# Patient Record
Sex: MALE | Race: WHITE | HISPANIC OR LATINO | Employment: FULL TIME | URBAN - METROPOLITAN AREA
[De-identification: names, ages, dates, MRNs, and addresses within clinical notes are randomized per-mention and may not be internally consistent; named-entity substitution may affect disease eponyms.]

---

## 2020-06-03 ENCOUNTER — HOSPITAL ENCOUNTER (EMERGENCY)
Facility: MEDICAL CENTER | Age: 20
End: 2020-06-03
Attending: EMERGENCY MEDICINE
Payer: MEDICAID

## 2020-06-03 VITALS
SYSTOLIC BLOOD PRESSURE: 113 MMHG | HEIGHT: 68 IN | RESPIRATION RATE: 16 BRPM | OXYGEN SATURATION: 98 % | WEIGHT: 195.11 LBS | TEMPERATURE: 96.8 F | BODY MASS INDEX: 29.57 KG/M2 | DIASTOLIC BLOOD PRESSURE: 69 MMHG | HEART RATE: 75 BPM

## 2020-06-03 DIAGNOSIS — S29.011A MUSCLE STRAIN OF CHEST WALL, INITIAL ENCOUNTER: ICD-10-CM

## 2020-06-03 PROCEDURE — 99283 EMERGENCY DEPT VISIT LOW MDM: CPT

## 2020-06-03 PROCEDURE — 700101 HCHG RX REV CODE 250: Performed by: EMERGENCY MEDICINE

## 2020-06-03 PROCEDURE — A9270 NON-COVERED ITEM OR SERVICE: HCPCS | Performed by: EMERGENCY MEDICINE

## 2020-06-03 PROCEDURE — 700102 HCHG RX REV CODE 250 W/ 637 OVERRIDE(OP): Performed by: EMERGENCY MEDICINE

## 2020-06-03 RX ORDER — IBUPROFEN 600 MG/1
600 TABLET ORAL EVERY 6 HOURS PRN
Qty: 30 TAB | Refills: 0 | Status: SHIPPED | OUTPATIENT
Start: 2020-06-03 | End: 2023-05-30

## 2020-06-03 RX ORDER — IBUPROFEN 600 MG/1
600 TABLET ORAL ONCE
Status: COMPLETED | OUTPATIENT
Start: 2020-06-03 | End: 2020-06-03

## 2020-06-03 RX ORDER — LIDOCAINE 50 MG/G
1 PATCH TOPICAL EVERY 24 HOURS
Status: DISCONTINUED | OUTPATIENT
Start: 2020-06-03 | End: 2020-06-03 | Stop reason: HOSPADM

## 2020-06-03 RX ORDER — LIDOCAINE 50 MG/G
1 PATCH TOPICAL EVERY 24 HOURS
Qty: 10 PATCH | Refills: 0 | Status: SHIPPED | OUTPATIENT
Start: 2020-06-03 | End: 2023-05-30

## 2020-06-03 RX ADMIN — IBUPROFEN 600 MG: 600 TABLET ORAL at 19:00

## 2020-06-03 RX ADMIN — LIDOCAINE 1 PATCH: 50 PATCH TOPICAL at 19:01

## 2020-06-03 ASSESSMENT — LIFESTYLE VARIABLES: DO YOU DRINK ALCOHOL: NO

## 2020-06-04 NOTE — ED TRIAGE NOTES
Chief Complaint   Patient presents with   • Side Pain     Pain in left side and under left arm.  Noted since sat.  Was unable to work today.  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  Supr aware he was coming to ER

## 2020-06-04 NOTE — ED NOTES
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  Discharge instructions given and reviewed, patient educated to follow up with PCP, verbalized understanding.  Prescriptions given x 2.  All personal belongings in possession.  No questions at this time.

## 2020-06-04 NOTE — ED NOTES
Patient medicated for pain, see MAR.  Patient educated on lidocaine patch placement and removal after 12 hours, verbalized understanding.

## 2020-06-04 NOTE — ED PROVIDER NOTES
"ED Provider Note    CHIEF COMPLAINT  Chief Complaint   Patient presents with   • Side Pain     Pain in left side and under left arm.  Noted since sat.  Was unable to work today.  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  He does that he was working quite diligently in the other day and since that time is had persistent pain as noted above.  Worse with movement.  No difficulty breathing.  No fever chills.  No cough or congestion.  No abdominal discomfort.  Denies any blunt trauma.    REVIEW OF SYSTEMS  See HPI for further details. All other systems are negative.     PAST MEDICAL HISTORY       SOCIAL HISTORY  Social History     Tobacco Use   • Smoking status: Never Smoker   • Smokeless tobacco: Never Used   Substance and Sexual Activity   • Alcohol use: Yes     Comment: rarely   • Drug use: Never   • Sexual activity: Not on file       SURGICAL HISTORY  patient denies any surgical history    CURRENT MEDICATIONS  Home Medications     Reviewed by Ashlee Duncan R.N. (Registered Nurse) on 06/03/20 at 1818  Med List Status: Complete   Medication Last Dose Status        Patient Dima Taking any Medications                       ALLERGIES  No Known Allergies    PHYSICAL EXAM  VITAL SIGNS: /69   Pulse 75   Temp 36 °C (96.8 °F) (Temporal)   Resp 16   Ht 1.727 m (5' 8\")   Wt 88.5 kg (195 lb 1.7 oz)   SpO2 98%   BMI 29.67 kg/m²  @KYLAH[[id number]::@  Pulse ox interpretation: I interpret this pulse ox as normal.  Constitutional: Alert in no apparent distress.  HENT: Normocephalic, Atraumatic, Bilateral external ears normal. Nose normal.   Eyes: Pupils are equal and reactive. Conjunctiva normal, non-icteric.   Heart: Regular rate and rythm, no " murmurs.    Lungs: Clear to auscultation bilaterally.  Chest wall: Tender superior lateral chest just inferior to the axilla.  No bony step-off deformity.  No ecchymosis.  No rash.  No crepitance.  Reproduces pain.  Skin: Warm, Dry, No erythema, No rash.   Neurologic: Alert, Grossly non-focal.   Psychiatric: Affect normal, Judgment normal, Mood normal, Appears appropriate and not intoxicated.           COURSE & MEDICAL DECISION MAKING  Pertinent Labs & Imaging studies reviewed. (See chart for details)  Patient presented to the emerge department with the above complaint.  Muscular strain likely given presentation.  I do not believe that imaging would be beneficial at this point.  Very low likelihood of any rib fracture or other underlying issue.  Patient will be started with Lidoderm patch and NSAID.  This was initiated here and will be continued at home.  He has been given outpatient follow-up as well.  He is any return precautions with any changes or worsening.       The patient will return for worsening symptoms and is stable at the time of discharge. The patient verbalizes understanding and will comply.    FINAL IMPRESSION  1. Muscle strain of chest wall, initial encounter               Electronically signed by: Sunil Wang M.D., 6/3/2020 6:44 PM

## 2020-06-23 ENCOUNTER — HOSPITAL ENCOUNTER (EMERGENCY)
Facility: MEDICAL CENTER | Age: 20
End: 2020-06-23
Attending: EMERGENCY MEDICINE
Payer: COMMERCIAL

## 2020-06-23 ENCOUNTER — NON-PROVIDER VISIT (OUTPATIENT)
Dept: OCCUPATIONAL MEDICINE | Facility: CLINIC | Age: 20
End: 2020-06-23
Payer: COMMERCIAL

## 2020-06-23 VITALS
TEMPERATURE: 98.1 F | DIASTOLIC BLOOD PRESSURE: 80 MMHG | BODY MASS INDEX: 29 KG/M2 | OXYGEN SATURATION: 98 % | HEART RATE: 98 BPM | HEIGHT: 69 IN | WEIGHT: 195.77 LBS | RESPIRATION RATE: 18 BRPM | SYSTOLIC BLOOD PRESSURE: 122 MMHG

## 2020-06-23 DIAGNOSIS — M54.9 ACUTE MID BACK PAIN: ICD-10-CM

## 2020-06-23 DIAGNOSIS — Z02.83 ENCOUNTER FOR DRUG SCREENING: ICD-10-CM

## 2020-06-23 LAB
AMP AMPHETAMINE: NORMAL
BAR BARBITURATES: NORMAL
BREATH ALCOHOL COMMENT: NORMAL
BZO BENZODIAZEPINES: NORMAL
COC COCAINE: NORMAL
INT CON NEG: NORMAL
INT CON POS: NORMAL
MDMA ECSTASY: NORMAL
MET METHAMPHETAMINES: NORMAL
MTD METHADONE: NORMAL
OPI OPIATES: NORMAL
OXY OXYCODONE: NORMAL
PCP PHENCYCLIDINE: NORMAL
POC BREATHALIZER: 0 PERCENT (ref 0–0.01)
POC URINE DRUG SCREEN OCDRS: NORMAL
THC: NORMAL

## 2020-06-23 PROCEDURE — 80305 DRUG TEST PRSMV DIR OPT OBS: CPT | Performed by: NURSE PRACTITIONER

## 2020-06-23 PROCEDURE — 99282 EMERGENCY DEPT VISIT SF MDM: CPT

## 2020-06-23 PROCEDURE — 8899 PR URINE 11 PANEL - AFTER HOURS: Performed by: NURSE PRACTITIONER

## 2020-06-23 PROCEDURE — 82075 ASSAY OF BREATH ETHANOL: CPT | Performed by: NURSE PRACTITIONER

## 2020-06-23 RX ORDER — IBUPROFEN 600 MG/1
600 TABLET ORAL EVERY 6 HOURS PRN
Qty: 30 TAB | Refills: 0 | Status: SHIPPED | OUTPATIENT
Start: 2020-06-23 | End: 2023-05-30

## 2020-06-23 RX ORDER — LIDOCAINE 50 MG/G
1 PATCH TOPICAL EVERY 24 HOURS
Qty: 10 PATCH | Refills: 0 | Status: SHIPPED | OUTPATIENT
Start: 2020-06-23 | End: 2023-05-30

## 2020-06-23 NOTE — LETTER
"  FORM C-4:  EMPLOYEE’S CLAIM FOR COMPENSATION/ REPORT OF INITIAL TREATMENT  EMPLOYEE’S CLAIM - PROVIDE ALL INFORMATION REQUESTED   First Name Fidel Last Name Jaziel Birthdate 2000  Sex male Claim Number   Home Employee Address [address] [address]  [facility] [place] [facility] [place] [facility] [place]                                     Zip  [address] Height  1.753 m (5' 9\") Weight  88.8 kg (195 lb 12.3 oz) N  [phone]   Mailing Employee Address [address] [address]   [place] [facility] [place]               Zip  [address] Telephone  [phone] (home)  Primary Language Spoken  English   Insurer   Third Party   [employer] Employee's Occupation (Job Title) When Injury or Occupational Disease Occurred  Unknown   Employer's Name [employer] Telephone [phone]    Employer Address [address] [facility] [address] Zip [address]   Date of Injury  5/30/2020       Hour of Injury  7:00 AM Date Employer Notified  6/3/2020 Last Day of Work after Injury or Occupational Disease  6/23/2020 Supervisor to Whom Injury Reported  Brandy GIBBS   Address or Location of Accident (if applicable) [facility]   What were you doing at the time of accident? (if applicable) N/A    How did this injury or occupational disease occur? Be specific and answer in detail. 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 N/A Witnesses to the Accident  N/A   Nature of Injury or Occupational Disease  Workers' Compensation Part(s) of Body Injured or Affected  Lower Back Area (Lumbar Area & Lumbo-Sacral), N/A, N/A    I CERTIFY THAT THE ABOVE IS TRUE AND CORRECT TO THE BEST OF MY KNOWLEDGE AND THAT I HAVE PROVIDED THIS INFORMATION IN ORDER TO OBTAIN THE BENEFITS OF [place] OR [place]).  I HEREBY AUTHORIZE ANY PHYSICIAN, CHIROPRACTOR, SURGEON, PRACTITIONER, OR OTHER PERSON, ANY HOSPITAL, INCLUDING [facility] OR [facility], ANY MEDICAL SERVICE ORGANIZATION, ANY INSURANCE COMPANY, OR OTHER INSTITUTION OR ORGANIZATION TO RELEASE TO EACH OTHER, ANY MEDICAL OR OTHER INFORMATION, INCLUDING BENEFITS PAID OR PAYABLE, PERTINENT TO THIS INJURY OR DISEASE, EXCEPT INFORMATION RELATIVE TO DIAGNOSIS, TREATMENT AND/OR COUNSELING FOR AIDS, PSYCHOLOGICAL CONDITIONS, ALCOHOL OR CONTROLLED SUBSTANCES, FOR WHICH I MUST GIVE SPECIFIC AUTHORIZATION.  A PHOTOSTAT OF THIS AUTHORIZATION SHALL BE AS VALID AS THE ORIGINAL.  Date  06/23/2020     Place  [facility] [place]   Employee’s Signature   THIS REPORT MUST BE COMPLETED AND MAILED WITHIN 3 WORKING DAYS OF TREATMENT   Place [facility], EMERGENCY DEPT                                                                             Name of Facility [facility]   Date  6/23/2020 Diagnosis  (M54.9) Acute mid back pain Is there evidence the injured employee was under the influence of alcohol and/or another controlled substance at the time of accident?   Hour  10:39 AM Description of Injury or Disease  Acute mid back pain     Treatment     Have you advised the patient to remain off work five days or more?             X-Ray Findings    If Yes   From Date    To Date      From information given by the employee, together with medical evidence, can you directly connect this injury or occupational disease as job incurred?   If No, is employee capable of: Full Duty    Modified Duty      Is additional medical care by a physician indicated?   If Modified Duty, Specify any Limitations / Restrictions       Do you know of any previous injury or disease contributing to this condition or occupational disease?      Date 6/23/2020 Print Doctor’s Name Ken Lara certify  "the employer’s copy of this form was mailed on:   Address [address]  [place] [id number] [phone] INSURER’S USE ONLY   Provider’s Tax ID Number [id number] Telephone Dept: [phone]    Doctor’s Signature ROMEL Merlos D.O. Degree  M.D.      Form C-4 (rev.10/07)                                                                         BRIEF DESCRIPTION OF RIGHTS AND BENEFITS  (Pursuant to [place] 616C.050)    Notice of Injury or Occupational Disease (Incident Report Form C-1): If an injury or occupational disease (OD) arises out of and in the course of employment, you must provide written notice to your employer as soon as practicable, but no later than 7 days after the accident or OD. Your employer shall maintain a sufficient supply of the required forms.    Claim for Compensation (Form C-4): If medical treatment is sought, the form C-4 is available at the place of initial treatment. A completed \"Claim for Compensation\" (Form C-4) must be filed within 90 days after an accident or OD. The treating physician or chiropractor must, within 3 working days after treatment, complete and mail to the employer, the employer's insurer and third-party , the Claim for Compensation.    Medical Treatment: If you require medical treatment for your on-the-job injury or OD, you may be required to select a physician or chiropractor from a list provided by your workers’ compensation insurer, if it has contracted with an Organization for Managed Care (MCO) or Preferred Provider Organization (PPO) or providers of health care. If your employer has not entered into a contract with an MCO or PPO, you may select a physician or chiropractor from the Panel of Physicians and Chiropractors. Any medical costs related to your industrial injury or OD will be paid by your insurer.    Temporary Total Disability (TTD): If your doctor has certified that you are unable to work for a period of at least 5 consecutive days, or " 5 cumulative days in a 20-day period, or places restrictions on you that your employer does not accommodate, you may be entitled to TTD compensation.    Temporary Partial Disability (TPD): If the wage you receive upon reemployment is less than the compensation for TTD to which you are entitled, the insurer may be required to pay you TPD compensation to make up the difference. TPD can only be paid for a maximum of 24 months.    Permanent Partial Disability (PPD): When your medical condition is stable and there is an indication of a PPD as a result of your injury or OD, within 30 days, your insurer must arrange for an evaluation by a rating physician or chiropractor to determine the degree of your PPD. The amount of your PPD award depends on the date of injury, the results of the PPD evaluation and your age and wage.    Permanent Total Disability (PTD): If you are medically certified by a treating physician or chiropractor as permanently and totally disabled and have been granted a PTD status by your insurer, you are entitled to receive monthly benefits not to exceed 66 2/3% of your average monthly wage. The amount of your PTD payments is subject to reduction if you previously received a PPD award.    Vocational Rehabilitation Services: You may be eligible for vocational rehabilitation services if you are unable to return to the job due to a permanent physical impairment or permanent restrictions as a result of your injury or occupational disease.    Transportation and Per Qiana Reimbursement: You may be eligible for travel expenses and per qiana associated with medical treatment.    Reopening: You may be able to reopen your claim if your condition worsens after claim closure.     Appeal Process: If you disagree with a written determination issued by the insurer or the insurer does not respond to your request, you may appeal to the Department of Administration, , by following the instructions contained in  your determination letter. You must appeal the determination within 70 days from the date of the determination letter at [address], [address], [place], [place] [address], or [address], [address], [place], [place] [address]. If you disagree with the  decision, you may appeal to the Department of Administration, . You must file your appeal within 30 days from the date of the  decision letter at [address], [address], [place], [place] [address], or [address], [address], [place], [place] [address]. If you disagree with a decision of an , you may file a petition for judicial review with the District Court. You must do so within 30 days of the Appeal Officer’s decision. You may be represented by an  at your own expense or you may contact the [facility] for possible representation.    [place]  for Injured Workers (NAIW): If you disagree with a  decision, you may request that NAIW represent you without charge at an  Hearing. For information regarding denial of benefits, you may contact the [facility] at: [address], [address], [place] [address], [phone], or [address], [place] [address], [phone]    To File a Complaint with the Division: If you wish to file a complaint with the  of the Division of Industrial Relations (DIR),  please contact the Workers’ Compensation Section, [address], [address], [place], [place] [address], telephone [phone]) [phone], or [address], [address], [place], [place] [address], telephone [phone]) [phone].    For assistance with Workers’ Compensation Issues: You may contact the Office of the Governor Consumer Health Assistance, [address], [place], [place] [address], Toll Free 1-136.786.7363, Web site: http://govVolvant.[facility].nv., E-mail jovanny@govcha.[facility].nv.  D-2  (rev. 06/18)              __________________________________________________________________                                   06/23/2020            Employee Name / Signature                                                                                                                            Date

## 2020-06-23 NOTE — DISCHARGE INSTRUCTIONS
This is a Workmen's Comp. injury, this requires further evaluation and treatment through the Workmen's Comp. clinic.  You are given the number for [facility].  They have all other locations call the number to find out which is most convenient for you.  Please see them either today or tomorrow for follow-up/you can be in the system to get continued care for this work-related injury.

## 2020-06-23 NOTE — ED NOTES
PT ambulated to [facility], pt stated that he was here for another lidocaine patch rx and a referral to chiropractor.  PT reports he lost his DC paperwork and did not follow up with clinic

## 2020-06-23 NOTE — ED TRIAGE NOTES
Pt ambulatory to triage c/o left side mid back pain seen here for same 2 weeks ago pt states not getting better. Pt did not take rx that was given to him last time he was here states he lost them. nad

## 2020-06-23 NOTE — LETTER
"  FORM C-4:  EMPLOYEE’S CLAIM FOR COMPENSATION/ REPORT OF INITIAL TREATMENT  EMPLOYEE’S CLAIM - PROVIDE ALL INFORMATION REQUESTED   First Name Fidel Last Name Jaziel Birthdate 2000  Sex male Claim Number   Home Employee Address [address] [address]  [facility]                                     Zip  [address] Height  1.753 m (5' 9\") Weight  88.8 kg (195 lb 12.3 oz) N  [id number]   Mailing Employee Address [address] [address]   [facility]               Zip  [address] Telephone  [phone] (home)  Primary Language Spoken  English   Insurer   Third Party   [employer] Employee's Occupation (Job Title) When Injury or Occupational Disease Occurred  Unknown   Employer's Name [employer] Telephone [phone]    Employer Address [address] [facility] [address]] Zip [address]   Date of Injury  6/6/2020       Hour of Injury  7:00 AM Date Employer Notified  6/8/2020 Last Day of Work after Injury or Occupational Disease  6/23/2020 Supervisor to Whom Injury Reported  N/A   Address or Location of Accident (if applicable) [facility]   What were you doing at the time of accident? (if applicable) N/A    How did this injury or occupational disease occur? Be specific and answer in detail. 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 N/A Witnesses to the Accident  N/A   Nature of Injury or Occupational Disease  Workers' Compensation Part(s) of Body Injured or Affected  Lower Back Area (Lumbar Area & Lumbo-Sacral), N/A, N/A    I CERTIFY THAT THE ABOVE IS TRUE AND CORRECT TO THE BEST OF MY KNOWLEDGE AND THAT I HAVE PROVIDED THIS INFORMATION IN ORDER TO OBTAIN THE BENEFITS OF [place]  I HEREBY AUTHORIZE ANY PHYSICIAN, CHIROPRACTOR, SURGEON, PRACTITIONER, OR OTHER PERSON, ANY HOSPITAL, INCLUDING [facility] OR [facility], ANY MEDICAL SERVICE ORGANIZATION, ANY INSURANCE COMPANY, OR OTHER INSTITUTION OR ORGANIZATION TO RELEASE TO EACH OTHER, ANY MEDICAL OR OTHER INFORMATION, INCLUDING BENEFITS PAID OR PAYABLE, PERTINENT TO THIS INJURY OR DISEASE, EXCEPT INFORMATION RELATIVE TO DIAGNOSIS, TREATMENT AND/OR COUNSELING FOR AIDS, PSYCHOLOGICAL CONDITIONS, ALCOHOL OR CONTROLLED SUBSTANCES, FOR WHICH I MUST GIVE SPECIFIC AUTHORIZATION.  A PHOTOSTAT OF THIS AUTHORIZATION SHALL BE AS VALID AS THE ORIGINAL.  Date 06/23/2020     [facility]    Employee’s Signature   THIS REPORT MUST BE COMPLETED AND MAILED WITHIN 3 WORKING DAYS OF TREATMENT   Place [facility], EMERGENCY DEPT                                                                             Name of Facility [facility]   Date  6/23/2020 Diagnosis  (M54.9) Acute mid back pain Is there evidence the injured employee was under the influence of alcohol and/or another controlled substance at the time of accident?   Hour  10:17 AM Description of Injury or Disease  Acute mid back pain     Treatment     Have you advised the patient to remain off work five days or more?             X-Ray Findings    If Yes   From Date    To Date      From information given by the employee, together with medical evidence, can you directly connect this injury or occupational disease as job incurred?   If No, is employee capable of: Full Duty    Modified Duty      Is additional medical care by a physician indicated?   If Modified Duty, Specify any Limitations / Restrictions       Do you know of any previous injury or disease contributing to this condition or occupational disease?      Date 6/23/2020 Print Doctor’s Name Ken Lara I certify the  "employer’s copy of this form was mailed on:   Address [address]  [place] [id number] [phone] INSURER’S USE ONLY   Provider’s Tax ID Number [id number] Telephone Dept: [phone]    Doctor’s Signature ROMEL Merlos D.O. Degree        Form C-4 (rev.10/07)                                                                         BRIEF DESCRIPTION OF RIGHTS AND BENEFITS  (Pursuant to [place] 616[place])    Notice of Injury or Occupational Disease (Incident Report Form C-1): If an injury or occupational disease (OD) arises out of and in the course of employment, you must provide written notice to your employer as soon as practicable, but no later than 7 days after the accident or OD. Your employer shall maintain a sufficient supply of the required forms.    Claim for Compensation (Form C-4): If medical treatment is sought, the form C-4 is available at the place of initial treatment. A completed \"Claim for Compensation\" (Form C-4) must be filed within 90 days after an accident or OD. The treating physician or chiropractor must, within 3 working days after treatment, complete and mail to the employer, the employer's insurer and third-party , the Claim for Compensation.    Medical Treatment: If you require medical treatment for your on-the-job injury or OD, you may be required to select a physician or chiropractor from a list provided by your workers’ compensation insurer, if it has contracted with an Organization for Managed Care (MCO) or Preferred Provider Organization (PPO) or providers of health care. If your employer has not entered into a contract with an MCO or PPO, you may select a physician or chiropractor from the Panel of Physicians and Chiropractors. Any medical costs related to your industrial injury or OD will be paid by your insurer.    Temporary Total Disability (TTD): If your doctor has certified that you are unable to work for a period of at least 5 consecutive days, or 5 " cumulative days in a 20-day period, or places restrictions on you that your employer does not accommodate, you may be entitled to TTD compensation.    Temporary Partial Disability (TPD): If the wage you receive upon reemployment is less than the compensation for TTD to which you are entitled, the insurer may be required to pay you TPD compensation to make up the difference. TPD can only be paid for a maximum of 24 months.    Permanent Partial Disability (PPD): When your medical condition is stable and there is an indication of a PPD as a result of your injury or OD, within 30 days, your insurer must arrange for an evaluation by a rating physician or chiropractor to determine the degree of your PPD. The amount of your PPD award depends on the date of injury, the results of the PPD evaluation and your age and wage.    Permanent Total Disability (PTD): If you are medically certified by a treating physician or chiropractor as permanently and totally disabled and have been granted a PTD status by your insurer, you are entitled to receive monthly benefits not to exceed 66 2/3% of your average monthly wage. The amount of your PTD payments is subject to reduction if you previously received a PPD award.    Vocational Rehabilitation Services: You may be eligible for vocational rehabilitation services if you are unable to return to the job due to a permanent physical impairment or permanent restrictions as a result of your injury or occupational disease.    Transportation and Per Qiana Reimbursement: You may be eligible for travel expenses and per qiana associated with medical treatment.    Reopening: You may be able to reopen your claim if your condition worsens after claim closure.     Appeal Process: If you disagree with a written determination issued by the insurer or the insurer does not respond to your request, you may appeal to the Department of Administration, , by following the instructions contained in  your determination letter. You must appeal the determination within 70 days from the date of the determination letter at [address], [address], [place], [place] [address], or [address], [address], [place], [place] [address]. If you disagree with the  decision, you may appeal to the Department of Administration, . You must file your appeal within 30 days from the date of the  decision letter at [address], [address], [place], [place] [address], or [address], [address], [place], [place] [address]. If you disagree with a decision of an , you may file a petition for judicial review with the District Court. You must do so within 30 days of the Appeal Officer’s decision. You may be represented by an  at your own expense or you may contact the [facility] for possible representation.    [place]  for Injured Workers (NAIW): If you disagree with a  decision, you may request that NAIW represent you without charge at an  Hearing. For information regarding denial of benefits, you may contact the [facility] at: [address], [address], [place] [address], [phone], or [address], [place] [address], [phone]    To File a Complaint with the Division: If you wish to file a complaint with the  of the Division of Industrial Relations (DIR),  please contact the Workers’ Compensation Section, [address], [address], [place], [place] [address], telephone [phone]) [phone], or [address], [address], [place], [place] [address], telephone [phone]) [phone].    For assistance with Workers’ Compensation Issues: You may contact the Office of the Governor Consumer Health Assistance, [address], [place], [place] [address], Toll Free 1-830.746.3778, Web site: http://govClutter.[facility].nv., E-mail jovanny@govcha.[facility].nv.  D-2  (rev. 06/18)              __________________________________________________________________                                    _06/23/2020__            Employee Name / Signature                                                                                                                            Date

## 2020-06-23 NOTE — ED PROVIDER NOTES
"ED Provider  Scribed for Ken Lara D.O. by Nata Vines. 6/23/2020  9:52 AM    Means of arrival:Walk in  History obtained from:patient  History limited by: none    CHIEF COMPLAINT  Chief Complaint   Patient presents with   • Back Pain       HPI  Fidel Sheriff is a 20 y.o. male who presents for back pain onset two weeks ago. He describes that he was pulling pallets at work when he strained his back. He was seen in the ED on 6/3/2020 he was given a lidocaine patch and NSAIDS which he says he did not take because he lost them. Patient states that he was placed on light duty at work however he is continuing to experience the same back pain. He denies any improvements in pain. Patient states he does not have pain when sitting but the pain returns when he starts to work. Denies symptoms of numbness or weakness in legs, radiation of pain to legs, or fevers. Patient states he has not yet followed up with the workmen's comp clinic.    REVIEW OF SYSTEMS  See HPI for further details.     PAST MEDICAL HISTORY   None pertinent    SOCIAL HISTORY  Social History     Tobacco Use   • Smoking status: Never Smoker   • Smokeless tobacco: Never Used   Substance and Sexual Activity   • Alcohol use: Yes     Comment: rarely   • Drug use: Never   • Sexual activity: Not on file       SURGICAL HISTORY  patient denies any surgical history    CURRENT MEDICATIONS  Home Medications     Reviewed by Regina Sharma R.N. (Registered Nurse) on 06/23/20 at 0900  Med List Status: Partial   Medication Last Dose Status   ibuprofen (MOTRIN) 600 MG Tab  Active   lidocaine (LIDODERM) 5 % Patch  Active                ALLERGIES  No Known Allergies    PHYSICAL EXAM  VITAL SIGNS: /84   Pulse (!) 113   Temp 36.7 °C (98 °F) (Temporal)   Resp 18   Ht 1.753 m (5' 9\")   Wt 88.8 kg (195 lb 12.3 oz)   SpO2 98%   BMI 28.91 kg/m²   Constitutional: Alert in no apparent distress.  HENT: Normocephalic, Atraumatic, mucous membranes are moist. "   Eyes: Conjunctiva normal, non-icteric.   Heart: No peripheral cyanosis   Lungs: Respirations are even and unlabored   Back: Left lumbo thoracic paraspinal muscle spasms with mild tenderness  Skin: Warm, Dry, normal color.   Neurologic: Alert, Grossly non-focal.   Psychiatric: Affect normal, Judgment normal, Mood normal, Appears appropriate and not intoxicated.       COURSE  Pertinent Labs & Imaging studies reviewed. (See chart for details)    9:52 AM - Patient seen and examined at bedside. Discussed plan of care, including providing him with ibuprofen and a lidocaine patch to last through the day. I informed the patient that he will need to follow up with the [facility] Comp clinic as soon as possible as for further evaluation and treatment. Provided the patient with contact information for [facility]. Recommended he rest as much as possible to help improve his pain. Patient will be discharged at this time. He verbalizes agreement with discharge and plan of care.    MEDICAL DECISION MAKING  This is a 20 y.o. male who presents with continued pain after a work-related lifting/pulling injury.  He has not gotten his prescriptions filled because he lost some, and his has not followed up with [facility].    He has no neurological deficits to suspect spinal cord injury.  He will be given new prescriptions for pain control, and he is instructed to follow-up for her endoscopic clinic today or tomorrow.  He is given 1 more day of duty so he has time to see [facility]. clinic    The patient will return for new or worsening symptoms and is stable at the time of discharge.    DISPOSITION:  Patient will be discharged home in stable condition.    FOLLOW UP:  [facility]  [address] [address]  [phone]  Today        OUTPATIENT MEDICATIONS:  New Prescriptions    IBUPROFEN (MOTRIN) 600 MG TAB    Take 1 Tab by mouth every 6 hours as needed.    LIDOCAINE (LIDODERM) 5 % PATCH    Apply 1  Patch to skin as directed every 24 hours.       FINAL IMPRESSION  1. Acute mid back pain         Nata AHN (Scribe), am scribing for, and in the presence of, Ken Lara D.O..    Electronically signed by: Nata Vines (Scribe), 6/23/2020    Ken AHN D.O. personally performed the services described in this documentation, as scribed by Nata Vines in my presence, and it is both accurate and complete. E    The note accurately reflects work and decisions made by me.  Ken Lara D.O.  6/23/2020  11:10 AM

## 2020-06-23 NOTE — LETTER
"  FORM C-4:  EMPLOYEE’S CLAIM FOR COMPENSATION/ REPORT OF INITIAL TREATMENT  EMPLOYEE’S CLAIM - PROVIDE ALL INFORMATION REQUESTED   First Name Fidel Last Name Jaziel Birthdate 2000  Sex male Claim Number   Home Employee Address [address] [address]  [facility]                                     Zip  [address] Height  1.753 m (5' 9\") Weight  88.8 kg (195 lb 12.3 oz) N  [id number]   Mailing Employee Address [address] [address]   [facility]               Zip  [address] Telephone  [phone] (home)  Primary Language Spoken  English   Insurer   Third Party   [employer] Employee's Occupation (Job Title) When Injury or Occupational Disease Occurred  Unknown   Employer's Name [employer] Telephone [phone]    Employer Address [address] [facility] [address]] Zip [address]   Date of Injury  4/30/2020       Hour of Injury  7:00 AM Date Employer Notified  5/2/2020 Last Day of Work after Injury or Occupational Disease  6/23/2020 Supervisor to Whom Injury Reported  N/A   Address or Location of Accident (if applicable) [facility]   What were you doing at the time of accident? (if applicable) N/A    How did this injury or occupational disease occur? Be specific and answer in detail. 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 N/A Witnesses to the Accident  N/A   Nature of Injury or Occupational Disease  Workers' Compensation Part(s) of Body Injured or Affected  Lower Back Area (Lumbar Area & Lumbo-Sacral), N/A, N/A    I CERTIFY THAT THE ABOVE IS TRUE AND CORRECT TO THE BEST OF MY KNOWLEDGE AND THAT I HAVE PROVIDED THIS INFORMATION IN ORDER TO OBTAIN THE BENEFITS OF [place]).  I HEREBY AUTHORIZE ANY PHYSICIAN, CHIROPRACTOR, SURGEON, PRACTITIONER, OR OTHER PERSON, ANY HOSPITAL, INCLUDING [facility] OR [facility], ANY MEDICAL SERVICE ORGANIZATION, ANY INSURANCE COMPANY, OR OTHER INSTITUTION OR ORGANIZATION TO RELEASE TO EACH OTHER, ANY MEDICAL OR OTHER INFORMATION, INCLUDING BENEFITS PAID OR PAYABLE, PERTINENT TO THIS INJURY OR DISEASE, EXCEPT INFORMATION RELATIVE TO DIAGNOSIS, TREATMENT AND/OR COUNSELING FOR AIDS, PSYCHOLOGICAL CONDITIONS, ALCOHOL OR CONTROLLED SUBSTANCES, FOR WHICH I MUST GIVE SPECIFIC AUTHORIZATION.  A PHOTOSTAT OF THIS AUTHORIZATION SHALL BE AS VALID AS THE ORIGINAL.  Date 06/23/2020   [facility]  Employee’s Signature   THIS REPORT MUST BE COMPLETED AND MAILED WITHIN 3 WORKING DAYS OF TREATMENT   [facility], EMERGENCY DEPT                                                                             Name of Facility [facility]   Date  6/23/2020 Diagnosis  (M54.9) Acute mid back pain Is there evidence the injured employee was under the influence of alcohol and/or another controlled substance at the time of accident?   Hour  10:30 AM Description of Injury or Disease  Acute mid back pain     Treatment     Have you advised the patient to remain off work five days or more?             X-Ray Findings    If Yes   From Date    To Date      From information given by the employee, together with medical evidence, can you directly connect this injury or occupational disease as job incurred?   If No, is employee capable of: Full Duty    Modified Duty      Is additional medical care by a physician indicated?   If Modified Duty, Specify any Limitations / Restrictions       Do you know of any previous injury or disease contributing to this condition or occupational disease?      Date 6/23/2020 Print Doctor’s Name Ken Lara I certify the  "employer’s copy of this form was mailed on:   Address [address] [place] [address]  [place] [id number] [phone] INSURER’S USE ONLY   Provider’s Tax ID Number [id number] Telephone Dept: [phone]    Doctor’s Signature ROMEL Merlos D.O. Degree  M.D.      Form C-4 (rev.10/07)                                                                         BRIEF DESCRIPTION OF RIGHTS AND BENEFITS  (Pursuant to [place] 616[place].050)    Notice of Injury or Occupational Disease (Incident Report Form C-1): If an injury or occupational disease (OD) arises out of and in the course of employment, you must provide written notice to your employer as soon as practicable, but no later than 7 days after the accident or OD. Your employer shall maintain a sufficient supply of the required forms.    Claim for Compensation (Form C-4): If medical treatment is sought, the form C-4 is available at the place of initial treatment. A completed \"Claim for Compensation\" (Form C-4) must be filed within 90 days after an accident or OD. The treating physician or chiropractor must, within 3 working days after treatment, complete and mail to the employer, the employer's insurer and third-party , the Claim for Compensation.    Medical Treatment: If you require medical treatment for your on-the-job injury or OD, you may be required to select a physician or chiropractor from a list provided by your workers’ compensation insurer, if it has contracted with an Organization for Managed Care (MCO) or Preferred Provider Organization (PPO) or providers of health care. If your employer has not entered into a contract with an MCO or PPO, you may select a physician or chiropractor from the Panel of Physicians and Chiropractors. Any medical costs related to your industrial injury or OD will be paid by your insurer.    Temporary Total Disability (TTD): If your doctor has certified that you are unable to work for a period of at least 5 consecutive days, or 5 " cumulative days in a 20-day period, or places restrictions on you that your employer does not accommodate, you may be entitled to TTD compensation.    Temporary Partial Disability (TPD): If the wage you receive upon reemployment is less than the compensation for TTD to which you are entitled, the insurer may be required to pay you TPD compensation to make up the difference. TPD can only be paid for a maximum of 24 months.    Permanent Partial Disability (PPD): When your medical condition is stable and there is an indication of a PPD as a result of your injury or OD, within 30 days, your insurer must arrange for an evaluation by a rating physician or chiropractor to determine the degree of your PPD. The amount of your PPD award depends on the date of injury, the results of the PPD evaluation and your age and wage.    Permanent Total Disability (PTD): If you are medically certified by a treating physician or chiropractor as permanently and totally disabled and have been granted a PTD status by your insurer, you are entitled to receive monthly benefits not to exceed 66 2/3% of your average monthly wage. The amount of your PTD payments is subject to reduction if you previously received a PPD award.    Vocational Rehabilitation Services: You may be eligible for vocational rehabilitation services if you are unable to return to the job due to a permanent physical impairment or permanent restrictions as a result of your injury or occupational disease.    Transportation and Per Qiana Reimbursement: You may be eligible for travel expenses and per qiana associated with medical treatment.    Reopening: You may be able to reopen your claim if your condition worsens after claim closure.     Appeal Process: If you disagree with a written determination issued by the insurer or the insurer does not respond to your request, you may appeal to the Department of Administration, , by following the instructions contained in  your determination letter. You must appeal the determination within 70 days from the date of the determination letter at [address], [address], [place], [place] [address], or [address], [address], [place], [place] [address]. If you disagree with the  decision, you may appeal to the Department of Administration, . You must file your appeal within 30 days from the date of the  decision letter at [address], [address], [place], [place] [address], or [address], [address], [place], [place] [address]. If you disagree with a decision of an , you may file a petition for judicial review with the District Court. You must do so within 30 days of the Appeal Officer’s decision. You may be represented by an  at your own expense or you may contact the [facility] for possible representation.    [place]  for Injured Workers (NAIW): If you disagree with a  decision, you may request that NAIW represent you without charge at an  Hearing. For information regarding denial of benefits, you may contact the [facility] at: [address], [address], [place] [address], [phone], or [address], [address], [place] [address], [phone]    To File a Complaint with the Division: If you wish to file a complaint with the  of the Division of Industrial Relations (DIR),  please contact the Workers’ Compensation Section, [address], [address], [place], [place] [address], telephone [phone]) [phone], or [address], [address], [place], [place] [address], telephone [phone].    For assistance with Workers’ Compensation Issues: You may contact the Office of the Governor Consumer Health Assistance, [address], [address], [place], [place] [address], Toll Free 1-819.116.5829, Web site: http://govSocial Tools.[facility].nv., E-mail jovanny@govcha.[facility].nv.  D-2  (rev. 06/18)              __________________________________________________________________                                    06/23/2020            Employee Name / Signature                                                                                                                            Date

## 2020-06-30 ENCOUNTER — NON-PROVIDER VISIT (OUTPATIENT)
Dept: OCCUPATIONAL MEDICINE | Facility: CLINIC | Age: 20
End: 2020-06-30
Payer: COMMERCIAL

## 2020-06-30 DIAGNOSIS — Z02.83 ENCOUNTER FOR DRUG SCREENING: ICD-10-CM

## 2020-06-30 PROCEDURE — 8911 PR MRO FEE: Performed by: PREVENTIVE MEDICINE

## 2021-02-14 ENCOUNTER — HOSPITAL ENCOUNTER (EMERGENCY)
Facility: MEDICAL CENTER | Age: 21
End: 2021-02-14
Attending: EMERGENCY MEDICINE
Payer: COMMERCIAL

## 2021-02-14 VITALS
RESPIRATION RATE: 16 BRPM | HEART RATE: 75 BPM | SYSTOLIC BLOOD PRESSURE: 125 MMHG | HEIGHT: 69 IN | OXYGEN SATURATION: 96 % | TEMPERATURE: 97.7 F | DIASTOLIC BLOOD PRESSURE: 74 MMHG | WEIGHT: 189.38 LBS | BODY MASS INDEX: 28.05 KG/M2

## 2021-02-14 DIAGNOSIS — B07.0 PLANTAR WART: ICD-10-CM

## 2021-02-14 PROCEDURE — 99282 EMERGENCY DEPT VISIT SF MDM: CPT

## 2021-02-14 NOTE — ED TRIAGE NOTES
"Chief Complaint   Patient presents with   • Foot Pain     Pt believes he has a wart on his left foot/heel area. Pt has had it for aprox 2.5 months. Pt has been treating with \"Compound W\" and seeing no improvement. Pt states it is getting worse and more painful now. Pt unable to walk on heel part of foot.      /85   Pulse 80   Temp 37 °C (98.6 °F) (Temporal)   Resp 14   Ht 1.753 m (5' 9\")   Wt 85.9 kg (189 lb 6 oz)   SpO2 97%   BMI 27.97 kg/m²     Patient arrived to ED for the above complaint. Triage process explained to patient. Patient placed in lobby and told to notify staff of any changes.   "

## 2021-02-15 NOTE — ED NOTES
Assist RN: Patient given discharge instructions and follow up information, verbalized understanding.

## 2021-02-15 NOTE — DISCHARGE INSTRUCTIONS
Follow-up with your primary doctor, podiatry and/or orthopedics.  Return for new or concerning symptoms to include redness swelling or discharge.

## 2021-02-15 NOTE — ED PROVIDER NOTES
"CHIEF COMPLAINT  Chief Complaint   Patient presents with   • Foot Pain     Pt believes he has a wart on his left foot/heel area. Pt has had it for aprox 2.5 months. Pt has been treating with \"Compound W\" and seeing no improvement. Pt states it is getting worse and more painful now. Pt unable to walk on heel part of foot.        HPI  Fidel Sheriff is a 21 y.o. male who presents with a plantar wart that he has had for about 2 and half months.  He has been using \"Compound W\" and not seeing any improvement.  It is painful when he walks.  He notes no fever or redness to the foot.    REVIEW OF SYSTEMS  No fever, no redness to the foot    PAST MEDICAL HISTORY  No past medical history on file.    FAMILY HISTORY  No family history on file.    SOCIAL HISTORY  Social History     Socioeconomic History   • Marital status:      Spouse name: Not on file   • Number of children: Not on file   • Years of education: Not on file   • Highest education level: Not on file   Occupational History   • Not on file   Tobacco Use   • Smoking status: Never Smoker   • Smokeless tobacco: Never Used   Substance and Sexual Activity   • Alcohol use: Yes     Comment: rarely   • Drug use: Never   • Sexual activity: Not on file   Other Topics Concern   • Not on file   Social History Narrative   • Not on file     Social Determinants of Health     Financial Resource Strain:    • Difficulty of Paying Living Expenses:    Food Insecurity:    • Worried About Running Out of Food in the Last Year:    • Ran Out of Food in the Last Year:    Transportation Needs:    • Lack of Transportation (Medical):    • Lack of Transportation (Non-Medical):    Physical Activity:    • Days of Exercise per Week:    • Minutes of Exercise per Session:    Stress:    • Feeling of Stress :    Social Connections:    • Frequency of Communication with Friends and Family:    • Frequency of Social Gatherings with Friends and Family:    • Attends Ca[employer] Services:    • Active " "Member of Clubs or Organizations:    • Attends Club or Organization Meetings:    • Marital Status:    Intimate Partner Violence:    • Fear of Current or Ex-Partner:    • Emotionally Abused:    • Physically Abused:    • Sexually Abused:        SURGICAL HISTORY  No past surgical history on file.    CURRENT MEDICATIONS  Home Medications    **Home medications have not yet been reviewed for this encounter**         ALLERGIES  No Known Allergies    PHYSICAL EXAM  VITAL SIGNS: /85   Pulse 80   Temp 37 °C (98.6 °F) (Temporal)   Resp 14   Ht 1.753 m (5' 9\")   Wt 85.9 kg (189 lb 6 oz)   SpO2 97%   BMI 27.97 kg/m²      Constitutional: Well developed, Well nourished, No acute distress, Non-toxic appearance.   HENT: Normocephalic, Atraumatic  Cardiovascular: Regular pulse  Lungs: No respiratory distress  Skin: Warm, Dry, no rash  Extremities: On the left foot there is excessive dried skin in a circular pattern and 2 separate but connected areas that is consistent with a plantar wart x2, no surrounding erythema, distal pulses motor and sensory function intact  Neurologic: Alert, appropriate, follows commands  Psychiatric: Affect normal    RADIOLOGY/PROCEDURES  The area was prepped with Betadine and some of the dead skin was removed from the bottom of the foot-no bleeding or other complications.    COURSE & MEDICAL DECISION MAKING  Pertinent Labs & Imaging studies reviewed. (See chart for details)  This is a 21-year-old with a plantar wart.  We do not have podiatry on call and the patient has already tried medication for a prolonged period of time.  He will be referred to orthopedics for further intervention.    FINAL IMPRESSION  1.  Plantar wart  2.   3.         Electronically signed by: Tristian Barron M.D., 2/14/2021 4:15 PM        "

## 2021-09-30 ENCOUNTER — APPOINTMENT (OUTPATIENT)
Dept: RADIOLOGY | Facility: MEDICAL CENTER | Age: 21
End: 2021-09-30
Attending: EMERGENCY MEDICINE
Payer: COMMERCIAL

## 2021-09-30 ENCOUNTER — HOSPITAL ENCOUNTER (EMERGENCY)
Facility: MEDICAL CENTER | Age: 21
End: 2021-09-30
Attending: EMERGENCY MEDICINE | Admitting: EMERGENCY MEDICINE
Payer: COMMERCIAL

## 2021-09-30 VITALS
DIASTOLIC BLOOD PRESSURE: 76 MMHG | SYSTOLIC BLOOD PRESSURE: 126 MMHG | OXYGEN SATURATION: 99 % | RESPIRATION RATE: 18 BRPM | HEIGHT: 69 IN | BODY MASS INDEX: 27.99 KG/M2 | TEMPERATURE: 98.2 F | WEIGHT: 189 LBS | HEART RATE: 72 BPM

## 2021-09-30 DIAGNOSIS — S90.111A CONTUSION OF RIGHT GREAT TOE WITHOUT DAMAGE TO NAIL, INITIAL ENCOUNTER: ICD-10-CM

## 2021-09-30 PROCEDURE — 99283 EMERGENCY DEPT VISIT LOW MDM: CPT

## 2021-09-30 PROCEDURE — 73660 X-RAY EXAM OF TOE(S): CPT | Mod: RT

## 2021-09-30 NOTE — ED NOTES
Patient has been updated of results and has been discharged home in stable condition by ERP.    Discharge paperwork has been provided to patient and gone over with patient by this nurse. Patient was given the opportunity to voice any questions or concerns and has verbalized understanding of discharge instructions with no questions or concerns.    Patient is A/Ox4 and vital signs are stable on discharge.    Discharge instructions/paperwork as well as all personal belongings are in possession of patient on discharge, no belongings were left behind in room.     Patient is ambulatory out to [facility] at this time where ride awaits.     Work note provided to patient.

## 2021-09-30 NOTE — DISCHARGE INSTRUCTIONS
Use over-the-counter Tylenol and ibuprofen to help with the pain.    Return to the ER for any worsening toe pain, worsening bleeding, redness or swelling to the toe, drainage of pus from the toe, numbness or tingling to the toe, or for any concerns.    Use ice to help with the pain.    Follow-up with your primary care physician within the next 1 to 2 days.  If you do not have a primary care physician, follow-up at the [facility] clinic in the next 1 to 2 days.  Please call for appointment.

## 2021-09-30 NOTE — ED PROVIDER NOTES
"ED Provider Note    Scribed for Latisha Frazier M.D. by Medina Courtney. 9/30/2021  9:09 AM    Primary care provider: Pcp Pt States None  Means of arrival: Walk in   History obtained from: Patient   History limited by: None    CHIEF COMPLAINT  Chief Complaint   Patient presents with   • Toe Pain       HPI  Fidel Sheriff is a 21 y.o. male who presents to the Emergency Department for right first toe pain onset 5 hours ago. The patient states that he stubbed his toe on the door jam this morning around 4 AM and reports that it was \"gushing blood\".  No bleeding at this time.  He says that he is concerned that he lifted his toe nail. Patient denies numbness or current bleeding. He is not diabetic and does not believe his tetanus is up to date.     REVIEW OF SYSTEMS  Positives include right first toe pain. Negatives include no numbness or current bleeding.      PAST MEDICAL HISTORY   none    SURGICAL HISTORY  patient denies any surgical history    SOCIAL HISTORY  Social History     Tobacco Use   • Smoking status: Never Smoker   • Smokeless tobacco: Never Used   Substance Use Topics   • Alcohol use: Yes     Comment: rarely   • Drug use: Never      Social History     Substance and Sexual Activity   Drug Use Never       FAMILY HISTORY  History reviewed. No pertinent family history.    CURRENT MEDICATIONS  Home Medications     Reviewed by Regina Sharma R.N. (Registered Nurse) on 09/30/21 at 0832  Med List Status: Partial   Medication Last Dose Status   ibuprofen (MOTRIN) 600 MG Tab  Active   ibuprofen (MOTRIN) 600 MG Tab  Active   lidocaine (LIDODERM) 5 % Patch  Active   lidocaine (LIDODERM) 5 % Patch  Active                ALLERGIES  No Known Allergies    PHYSICAL EXAM  VITAL SIGNS: /73   Pulse 76   Temp 36.8 °C (98.2 °F) (Tympanic)   Resp 16   Ht 1.753 m (5' 9\")   Wt 85.7 kg (189 lb)   SpO2 100%   BMI 27.91 kg/m²   Constitutional:  Alert in no apparent distress.  HENT: Normocephalic, Bilateral external " ears normal. Nose normal.   Eyes: Conjunctiva normal, non-icteric.   Thorax & Lungs: Easy unlabored respirations  Skin: Visualized skin warm and dry, No erythema, No rash.   Extremities:   Right foot: Right great toe there is a small superficial abrasion to the distal tip of the toe, just under the overgrown toenail. The nail itself is well adhered to nail bed, no subungual hematoma, small corn to medial side of toe, mild tenderness to the distal phalanx.  Neurologic: Alert, clear speech  Psychiatric: Affect and Mood normal    RADIOLOGY  DX-TOE(S) 2+ RIGHT   Final Result      No acute fracture or dislocation is seen.        The radiologist's interpretation of all radiological studies have been reviewed by me.    COURSE & MEDICAL DECISION MAKING  Nursing notes and vital signs were reviewed. (See chart for details)    9:09 AM - Patient seen and examined at bedside. Patient will be treated with Adacel 0.5 ml. Ordered DX-Toes Right to evaluate his symptoms.     Decision Making:  The patient presents to the Emergency Department with complaint of right great toe pain after he stubbed his toe on the door jam today.  He was concerned because he had some bleeding after he jammed his toe/toenail.  He was concerned that the bleeding came from underneath the toenail.  The toenail is well adhered to the nailbed.  No subungual hematoma.  He has a very small abrasion/partial-thickness laceration to the distal tip of the great toe.  He is tender at the distal phalanx.  X-ray is unremarkable.  No evidence of fracture.  This time he is to take over-the-counter Tylenol and ibuprofen for discomfort.  He can use ice to help with the pain.  His tetanus was updated here in the ER.  He is safe and stable for outpatient management discharge home.  Is been given strict return precautions and discharge instructions and he understands treatment plan and follow-up.      DISPOSITION:  Patient will be discharged home in stable  condition.    FOLLOW UP:  [address] [id number] [phone]  Schedule an appointment as soon as possible for a visit in 2 days  If symptoms worsen return to ER      OUTPATIENT MEDICATIONS:  New Prescriptions    No medications on file          FINAL IMPRESSION  1. Contusion of right great toe without damage to nail, initial encounter Acute          This dictation has been created using voice recognition software. The accuracy of the dictation is limited by the abilities of the software. I expect there may be some errors of grammar and possibly content. I made every attempt to manually correct the errors within my dictation. However, errors related to voice recognition software may still exist and should be interpreted within the appropriate context.     I, Medina Courtney (Keren), am scribing for, and in the presence of, Latisha Frazier M.D..    Electronically signed by: Medina Courtney (Keren), 9/30/2021    I, Latisha Frazier M.D. personally performed the services described in this documentation, as scribed by Medina Courtney in my presence, and it is both accurate and complete. E    The note accurately reflects work and decisions made by me.  Latisha Frazier M.D.  9/30/2021  10:09 AM

## 2021-09-30 NOTE — ED TRIAGE NOTES
Pt ambulatory to triage c/o right 1st toe pain after he stubbed it this am. Bruising noted but no obvious deformity. nad

## 2022-02-25 ENCOUNTER — APPOINTMENT (OUTPATIENT)
Dept: RADIOLOGY | Facility: MEDICAL CENTER | Age: 22
End: 2022-02-25
Attending: EMERGENCY MEDICINE
Payer: COMMERCIAL

## 2022-02-25 ENCOUNTER — HOSPITAL ENCOUNTER (EMERGENCY)
Facility: MEDICAL CENTER | Age: 22
End: 2022-02-25
Attending: EMERGENCY MEDICINE
Payer: COMMERCIAL

## 2022-02-25 VITALS
BODY MASS INDEX: 26.26 KG/M2 | TEMPERATURE: 97.5 F | SYSTOLIC BLOOD PRESSURE: 129 MMHG | HEART RATE: 83 BPM | RESPIRATION RATE: 18 BRPM | OXYGEN SATURATION: 97 % | HEIGHT: 70 IN | DIASTOLIC BLOOD PRESSURE: 83 MMHG | WEIGHT: 183.42 LBS

## 2022-02-25 DIAGNOSIS — S60.511A ABRASION OF RIGHT HAND, INITIAL ENCOUNTER: ICD-10-CM

## 2022-02-25 DIAGNOSIS — S60.221A CONTUSION OF RIGHT HAND, INITIAL ENCOUNTER: ICD-10-CM

## 2022-02-25 DIAGNOSIS — F43.21 GRIEF REACTION: ICD-10-CM

## 2022-02-25 PROCEDURE — 304217 HCHG IRRIGATION SYSTEM

## 2022-02-25 PROCEDURE — 90715 TDAP VACCINE 7 YRS/> IM: CPT | Performed by: EMERGENCY MEDICINE

## 2022-02-25 PROCEDURE — 99283 EMERGENCY DEPT VISIT LOW MDM: CPT

## 2022-02-25 PROCEDURE — 700111 HCHG RX REV CODE 636 W/ 250 OVERRIDE (IP): Performed by: EMERGENCY MEDICINE

## 2022-02-25 PROCEDURE — 90471 IMMUNIZATION ADMIN: CPT

## 2022-02-25 PROCEDURE — 73130 X-RAY EXAM OF HAND: CPT | Mod: RT

## 2022-02-25 RX ADMIN — CLOSTRIDIUM TETANI TOXOID ANTIGEN (FORMALDEHYDE INACTIVATED), CORYNEBACTERIUM DIPHTHERIAE TOXOID ANTIGEN (FORMALDEHYDE INACTIVATED), BORDETELLA PERTUSSIS TOXOID ANTIGEN (GLUTARALDEHYDE INACTIVATED), BORDETELLA PERTUSSIS FILAMENTOUS HEMAGGLUTININ ANTIGEN (FORMALDEHYDE INACTIVATED), BORDETELLA PERTUSSIS PERTACTIN ANTIGEN, AND BORDETELLA PERTUSSIS FIMBRIAE 2/3 ANTIGEN 0.5 ML: 5; 2; 2.5; 5; 3; 5 INJECTION, SUSPENSION INTRAMUSCULAR at 02:15

## 2022-02-25 NOTE — DISCHARGE PLANNING
Alert Team:    Contacted by RN to provide resources for pt for psychiatry/therapy. Discussed outpatient psychiatric services resource list with pt, along with contacting human resources at his job to assist with access to counseling services through his work. Pt verbalized understanding of resources and all questions answered. Updated RN.

## 2022-02-25 NOTE — ED PROVIDER NOTES
ED Provider Note    CHIEF COMPLAINT  Chief Complaint   Patient presents with   • Hand Pain     X 30 min       HPI  Fidel Sheriff is a 22 y.o. male who presents after punching a wall earlier this evening. He reports being stressed out recently and was angry so he hit a wall multiple times with his R hand.  He denies any SI or HI. He denies any AH or VH. He reports hand pain since the event but denies any other injuries sustained or any weakness/numbness. Pt reports that his mother passed away from cicayda in August.  He and his family have had a difficult time processing this and his family has been going through a considerable amount of turmoil.  He was perseverating over this this evening, became increasingly more sad and punched the wall out of frustration.  Patient reports he feels he would be safe if discharged home, he does not feel that he is a danger to himself or others.    REVIEW OF SYSTEMS  ROS  See HPI for further details. All other systems are negative.     PAST MEDICAL HISTORY       SOCIAL HISTORY  Social History     Tobacco Use   • Smoking status: Never Smoker   • Smokeless tobacco: Never Used   Vaping Use   • Vaping Use: Never used   Substance and Sexual Activity   • Alcohol use: Not Currently   • Drug use: Never   • Sexual activity: Not on file       SURGICAL HISTORY  patient denies any surgical history    CURRENT MEDICATIONS  Home Medications     Reviewed by Hanh Oconnell R.N. (Registered Nurse) on 02/25/22 at 0055  Med List Status: <None>   Medication Last Dose Status   ibuprofen (MOTRIN) 600 MG Tab  Active   ibuprofen (MOTRIN) 600 MG Tab  Active   lidocaine (LIDODERM) 5 % Patch  Active   lidocaine (LIDODERM) 5 % Patch  Active                ALLERGIES  No Known Allergies    PHYSICAL EXAM  Vitals:    02/25/22 0050   BP: 135/81   Pulse: 68   Resp: 18   Temp: 36.2 °C (97.2 °F)   SpO2: 100%       Physical Exam  Constitutional:       Appearance: He is well-developed.   Eyes:       Conjunctiva/sclera: Conjunctivae normal.   Pulmonary:      Effort: Pulmonary effort is normal.   Musculoskeletal:      Cervical back: Normal range of motion and neck supple.      Comments: Scattered abrasions on patient's fingers and hand.  Mild tenderness at the distal head of the third MCP.  Strength with sensation is intact in ulnar radial and median distribution.   Skin:     General: Skin is warm.   Neurological:      Mental Status: He is alert and oriented to person, place, and time.   Psychiatric:         Behavior: Behavior normal.           COURSE & MEDICAL DECISION MAKING  Pertinent Labs & Imaging studies reviewed. (See chart for details)    Patient here after punching a wall, his exam is consistent with likely bony contusion versus possible fracture of his third metacarpal.  Will check x-ray.  Patient's abrasions do not require repair.  These have been irrigated by nursing.  Patient's tetanus has been updated.  Patient denies any SI or HI, I do not believe that he is a danger to himself or others.  I have discussed his case with our alert team who is given patient resources for counseling.      The patient will return for worsening symptoms and is stable at the time of discharge. The patient verbalizes understanding and will comply.    FINAL IMPRESSION    1. Abrasion of right hand, initial encounter    2. Contusion of right hand, initial encounter    3. Grief reaction               Electronically signed by: Renny Sierra M.D., 2/25/2022 1:44 AM

## 2022-02-25 NOTE — ED TRIAGE NOTES
"  Chief Complaint   Patient presents with   • Hand Pain     X 30 min     Pt to triage for above complaint. Pt states about 30 mins ago he became very upset \"with everything going on in his life\" and punched a wall \"once or twice\" with his right hand. Multiple small lacerations noted to hand, no obvious deformity or active bleeding, +CMS. Pt is calm and cooperative in triage, denies SI/HI but states \"I just feel like I need to talk to somebody who will listen.\"    Pt is alert/oriented, following commands, and answering questions appropriately. No acute distress noted in triage and respirations are even and unlabored.   Pt placed in lobby and educated on triage process. Pt encouraged to alert staff for any changes in condition.    ./81   Pulse 68   Temp 36.2 °C (97.2 °F) (Temporal)   Resp 18   Ht 1.778 m (5' 10\")   Wt 83.2 kg (183 lb 6.8 oz)   SpO2 100% Comment: Room air  BMI 26.32 kg/m²       "

## 2022-02-25 NOTE — ED NOTES
Bandages placed to right hand after soaking hand in warm water. Pt given resources by alert team paulo.

## 2022-06-10 ENCOUNTER — HOSPITAL ENCOUNTER (EMERGENCY)
Facility: MEDICAL CENTER | Age: 22
End: 2022-06-10
Attending: EMERGENCY MEDICINE
Payer: COMMERCIAL

## 2022-06-10 ENCOUNTER — APPOINTMENT (OUTPATIENT)
Dept: RADIOLOGY | Facility: MEDICAL CENTER | Age: 22
End: 2022-06-10
Attending: EMERGENCY MEDICINE
Payer: COMMERCIAL

## 2022-06-10 VITALS
OXYGEN SATURATION: 98 % | WEIGHT: 181 LBS | TEMPERATURE: 97.4 F | BODY MASS INDEX: 25.91 KG/M2 | HEART RATE: 68 BPM | HEIGHT: 70 IN | RESPIRATION RATE: 18 BRPM | SYSTOLIC BLOOD PRESSURE: 133 MMHG | DIASTOLIC BLOOD PRESSURE: 73 MMHG

## 2022-06-10 DIAGNOSIS — S46.912A STRAIN OF LEFT SHOULDER, INITIAL ENCOUNTER: ICD-10-CM

## 2022-06-10 PROCEDURE — A9270 NON-COVERED ITEM OR SERVICE: HCPCS | Performed by: EMERGENCY MEDICINE

## 2022-06-10 PROCEDURE — 73030 X-RAY EXAM OF SHOULDER: CPT | Mod: LT

## 2022-06-10 PROCEDURE — 99284 EMERGENCY DEPT VISIT MOD MDM: CPT

## 2022-06-10 PROCEDURE — 700102 HCHG RX REV CODE 250 W/ 637 OVERRIDE(OP): Performed by: EMERGENCY MEDICINE

## 2022-06-10 RX ORDER — NAPROXEN 500 MG/1
500 TABLET ORAL 2 TIMES DAILY
Status: DISCONTINUED | OUTPATIENT
Start: 2022-06-10 | End: 2022-06-10 | Stop reason: HOSPADM

## 2022-06-10 RX ADMIN — NAPROXEN 500 MG: 500 TABLET ORAL at 09:10

## 2022-06-10 NOTE — ED PROVIDER NOTES
"ED Provider Note    CHIEF COMPLAINT  Chief Complaint   Patient presents with   • Low Back Pain     On the left side. Pt reports heavy lifting.        HPI    Primary care provider: Pcp Pt States None  Means of arrival: POV/Walk-in  History obtained from: Patient  History limited by: Nothing    Fidel Sheriff is a 22 y.o. male who presents with left side pain.  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  No specific falls or injuries or trauma.  Been sore for the last 3 days, fairly constant and worsening.  Not tried anything to feel better.  Wanted to \"get checked out\" before going back to work.  Does not wish to report this is a work-related injury.  No prior episodes.  Right-hand-dominant.  Pain is achy and sore, mostly to the left posterior shoulder and sometimes going down to the left lower back.  No numbness weakness or tingling, no bowel or bladder incontinence or inability to void.  No other recent illness or medical complaints.  No swelling, no skin changes, takes no daily meds.    REVIEW OF SYSTEMS  Constitutional: Negative for fever or chills.   HENT: Negative for headache or rhinorrhea.  Respiratory: Negative for cough or shortness of breath.    Cardiovascular: Negative for chest pain or loss of consciousness.   Gastrointestinal: Negative for nausea, vomiting, or abdominal pain.   Genitourinary: Negative for incontinence or inability to void.  Musculoskeletal: Positive for left shoulder and low back pain.  Skin: Negative for bruising or rash.   Neurological: Negative for sensory or motor changes.       PAST MEDICAL HISTORY  No chronic past medical history.    PAST FAMILY HISTORY  Reviewed with patient, no pertinent past family history.    SOCIAL HISTORY  Social History     Tobacco Use   • Smoking status: Never Smoker   • Smokeless tobacco: Never Used   Vaping Use   • Vaping Use: Never used   Substance and Sexual Activity   • Alcohol use: Not Currently   • Drug use: Never   • Sexual " "activity: Not on file       SURGICAL HISTORY  patient denies any surgical history    CURRENT MEDICATIONS  Home Medications     Reviewed by Lanie Sutton R.N. (Registered Nurse) on 06/10/22 at 0812  Med List Status: Partial   Medication Last Dose Status   ibuprofen (MOTRIN) 600 MG Tab  Active   ibuprofen (MOTRIN) 600 MG Tab  Active   lidocaine (LIDODERM) 5 % Patch  Active   lidocaine (LIDODERM) 5 % Patch  Active                ALLERGIES  No Known Allergies    PHYSICAL EXAM  VITAL SIGNS: /73   Pulse 68   Temp 36.3 °C (97.4 °F) (Temporal)   Resp 18   Ht 1.778 m (5' 10\")   Wt 82.1 kg (181 lb)   SpO2 98%   BMI 25.97 kg/m²    Pulse ox interpretation: On room air, I interpret this pulse ox as normal.  Constitutional: Well-developed, well-nourished, sitting up in chair.  HENT: Head is atraumatic. Mucous membranes moist.   Eyes: Conjunctivae are normal. EOMI.   Respiratory: No respiratory distress, wheezes, or rhonchi.    Cardiovascular: RRR, no m/r/g.  Abdomen: Soft, nontender.  Musculoskeletal: Normal range of motion. No edema.  Mild left posterior shoulder tenderness, no midline tenderness or step-offs to the back.  Neurological: Alert. No focal weakness or asymmetry.   Skin: No bruising or lesions. No Pallor.   Psych: Appropriate mood. Normal affect.      DIAGNOSTIC STUDIES / PROCEDURES      RADIOLOGY  DX-SHOULDER 2+ LEFT   Final Result      Normal.          COURSE & MEDICAL DECISION MAKING    This is a 22 y.o. male who presents with left shoulder and low back pain.    Differential Diagnosis includes but is not limited to:  Contusion, strain, fracture, dislocation    ED Course:  Well-appearing 22-year-old male with minor aches after heavy lifting recently.  Primary source of pain is left shoulder, plan x-ray and reevaluation.    Nothing acute on x-ray, likely strain, recommend NSAIDs, sling provided for comfort as needed, follow-up with Ortho in 1 week if pain persists, return immediately if any " worse.    Medications - No data to display    FINAL IMPRESSION  1. Strain of left shoulder, initial encounter        PRESCRIPTIONS  Discharge Medication List as of 6/10/2022  9:18 AM          FOLLOW UP  [facility], Emergency Dept  [address] [address] [facility] [id number] [phone]  Today  If you have ANY new or worse symptoms!    Bob Reyes M.D.  [address]  [facility] [id number] [phone]    Schedule an appointment as soon as possible for a visit in 1 week  for recheck with orthopedics    [facility] ([facility]) - Primary Care  [address] [facility] [address]  [phone]  Schedule an appointment as soon as possible for a visit in 2 days  for recheck and routine health care      -DISCHARGE-       Results, exam findings, clinical impression, presumed diagnosis, treatment options, and strict return precautions were discussed with the patient, and they verbalized understanding, agreed with, and appreciated the plan of care.    Pertinent Imaging studies reviewed and verified by myself, as well as nursing notes and the patient's past medical, family, and social histories (See chart for details).    Portions of this record were made with voice recognition software.  Despite my review, spelling/grammar/context errors may still remain.  Interpretation of this chart should be taken in this context.    Electronically signed by Irineo Saeed M.D. on 6/11/2022 at 7:39 AM.

## 2022-06-10 NOTE — ED NOTES
Discharge instructions reviewed with pt who voices understanding of all follow up and knows to return for any new/concerning symptoms.

## 2022-06-10 NOTE — ED NOTES
Pt medicated per MAR.    Sling applied to LUE, NV intact before and after placement.      VSS. AAOx4

## 2022-06-10 NOTE — DISCHARGE INSTRUCTIONS
You were seen and evaluated in the Emergency Department at [facility] [place] for:     Left shoulder/back pain    You had the following tests and studies:    Xray shows no broken bones or dislocations    You received the following medications:    naproxen    You received the following prescriptions:    Take naproxen 500mg twice daily, and apply icy hot two to three times per day. Use sling provided. Return immediately if any worse!  ----------------------------    Please make sure to follow up with:    Primary care clinic and orthopedics, return immediately if any worse.   ----------------------------    We always encourage patients to return IMMEDIATELY if they have:  Increased or changing pain, passing out, fevers over 100.4 (taken in your mouth or rectally) for more than 2 days, redness or swelling of skin or tissues, feeling like your heart is beating fast, chest pain that is new or worsening, trouble breathing, feeling like your throat is closing up and can not breath, inability to walk, weakness of any part of your body, new dizziness, severe bleeding that won't stop from any part of your body, if you can't eat or drink, or if you have any other concerns.   If you feel worse, please know that you can always return with any questions, concerns, worse symptoms, or you are feeling unsafe. We certainly cannot say for sure that we have ruled out every illness or dangerous disease, but we feel that at this specific time, your exam, tests, and vital signs like heart rate and blood pressure are safe for discharge.

## 2022-06-10 NOTE — ED TRIAGE NOTES
"Chief Complaint   Patient presents with   • Low Back Pain     On the left side. Pt reports heavy lifting.      /77   Pulse 71   Temp 36.9 °C (98.5 °F) (Temporal)   Resp 16   Ht 1.778 m (5' 10\")   Wt 82.1 kg (181 lb)   SpO2 98%   Pt informed of wait times. Educated on triage process.  Asked to return to triage RN for any new or worsening of symptoms. Thanked for patience.        "

## 2022-06-11 NOTE — DISCHARGE PLANNING
note:    RN CM received incoming call from pt requesting return to work note. Completed work note, emailed to pt, and scanned into .

## 2022-12-24 ENCOUNTER — HOSPITAL ENCOUNTER (EMERGENCY)
Facility: MEDICAL CENTER | Age: 22
End: 2022-12-24
Attending: EMERGENCY MEDICINE

## 2022-12-24 VITALS
RESPIRATION RATE: 18 BRPM | TEMPERATURE: 98.9 F | HEART RATE: 84 BPM | OXYGEN SATURATION: 95 % | SYSTOLIC BLOOD PRESSURE: 130 MMHG | HEIGHT: 70 IN | WEIGHT: 174.16 LBS | BODY MASS INDEX: 24.93 KG/M2 | DIASTOLIC BLOOD PRESSURE: 66 MMHG

## 2022-12-24 DIAGNOSIS — J02.0 STREP PHARYNGITIS: ICD-10-CM

## 2022-12-24 LAB — S PYO DNA SPEC NAA+PROBE: DETECTED

## 2022-12-24 PROCEDURE — 87651 STREP A DNA AMP PROBE: CPT

## 2022-12-24 PROCEDURE — A9270 NON-COVERED ITEM OR SERVICE: HCPCS | Performed by: EMERGENCY MEDICINE

## 2022-12-24 PROCEDURE — 99284 EMERGENCY DEPT VISIT MOD MDM: CPT

## 2022-12-24 PROCEDURE — 700111 HCHG RX REV CODE 636 W/ 250 OVERRIDE (IP): Performed by: EMERGENCY MEDICINE

## 2022-12-24 PROCEDURE — 96372 THER/PROPH/DIAG INJ SC/IM: CPT

## 2022-12-24 PROCEDURE — 700102 HCHG RX REV CODE 250 W/ 637 OVERRIDE(OP): Performed by: EMERGENCY MEDICINE

## 2022-12-24 RX ORDER — IBUPROFEN 600 MG/1
600 TABLET ORAL ONCE
Status: COMPLETED | OUTPATIENT
Start: 2022-12-24 | End: 2022-12-24

## 2022-12-24 RX ADMIN — PENICILLIN G BENZATHINE 1.2 MILLION UNITS: 1200000 INJECTION, SUSPENSION INTRAMUSCULAR at 12:42

## 2022-12-24 RX ADMIN — IBUPROFEN 600 MG: 600 TABLET, FILM COATED ORAL at 10:51

## 2022-12-24 ASSESSMENT — LIFESTYLE VARIABLES: DO YOU DRINK ALCOHOL: NO

## 2022-12-24 ASSESSMENT — PAIN DESCRIPTION - PAIN TYPE: TYPE: ACUTE PAIN

## 2022-12-24 NOTE — DISCHARGE INSTRUCTIONS
Follow-up with primary care next week for reevaluation, to establish care.    You were given a shot of antibiotics in the emergency department, this will treat your strep throat infection entirely.  No oral antibiotics are indicated.    Tylenol and ibuprofen, alternating if needed, as needed for fever or discomfort.  Over-the-counter medications as needed for any other cold or flu symptoms.    A cool-mist humidifier may be beneficial for congestion and sore throat as well.    Encourage oral fluid hydration.  Diet and activity as tolerated.    Return to the emergency department for intractable fever, altered mental status, difficulty swallowing or breathing, neck pain or stiffness, change in voice, shortness of breath/wheezing/retractions/stridor, vomiting or other new concerns.

## 2022-12-24 NOTE — ED TRIAGE NOTES
Chief Complaint   Patient presents with    Nasal Congestion     Pt complains of nasal congestion and rhinorrhea x2 days.    Sore Throat     Pt complains of sore throat and pain when swallowing x2 weeks. Pt states he feels a lump in his Rt side neck.     Pt educated upon triage process and told to inform  staff of any changes in condition so that Pt may be reassessed. No further questions at this time. Pt sitting out in lobby.

## 2022-12-24 NOTE — ED PROVIDER NOTES
"ED Provider Note    CHIEF COMPLAINT  Chief Complaint   Patient presents with    Nasal Congestion     Pt complains of nasal congestion and rhinorrhea x2 days.    Sore Throat     Pt complains of sore throat and pain when swallowing x2 weeks. Pt states he feels a lump in his Rt side neck.       HPI  Fidel Sheriff is a 22 y.o. male who presents to the emergency department through triage for sore throat.  Patient states he is had sore throat for couple of weeks, but significantly worse in the last couple of days.  Pain with swallow.  Feels a \"lump\" in the right side of his neck.  Now 2 days of nasal congestion and rhinorrhea.  No cough.  No shortness of breath.  No vomiting.  Decreased appetite but tolerating fluids.  Tactile fever and chills.    REVIEW OF SYSTEMS  See HPI for further details. All other systems are negative.     PAST MEDICAL HISTORY   Denies    SOCIAL HISTORY  Social History     Tobacco Use    Smoking status: Never    Smokeless tobacco: Never   Vaping Use    Vaping Use: Never used   Substance and Sexual Activity    Alcohol use: Not Currently    Drug use: Never    Sexual activity: Not on file       SURGICAL HISTORY  patient denies any surgical history    CURRENT MEDICATIONS  Home Medications    **Home medications have not yet been reviewed for this encounter**         ALLERGIES  No Known Allergies    PHYSICAL EXAM  VITAL SIGNS: /71   Pulse (!) 108   Temp 37.6 °C (99.7 °F) (Oral)   Resp 16   Ht 1.778 m (5' 10\")   Wt 79 kg (174 lb 2.6 oz)   SpO2 98%   BMI 24.99 kg/m²   Pulse ox interpretation: I interpret this pulse ox as normal.  Constitutional: Alert in no apparent distress.  HENT: Normocephalic, atraumatic. Bilateral external ears normal, Nose normal. Moist mucous membranes.  Oropharynx with diffuse erythema, mild swelling, no unilateral tonsillar enlargement.  Uvula midline.  Tolerating secretions.  No stridor or dysphonia.  Eyes: Pupils are equal and reactive, Conjunctiva normal. "   Neck: Normal range of motion, Supple.  No meningeal irritation.  Bilateral anterior cervical chain lymphadenopathy.  Lymphatic: No lymphadenopathy noted.  Bilateral anterior chain cervical lymphadenopathy.  Cardiovascular: Regular rate and rhythm, no murmurs. Distal pulses intact.    Thorax & Lungs: Normal breath sounds.  No wheezing/rales/ronchi. No increased work of breathing, clipped speech or retractions.   Abdomen: Soft, non-distended, non-tender to palpation.   Skin: Warm, Dry, No erythema, No rash.   Musculoskeletal: Good range of motion in all major joints.   Neurologic: Alert and orient x4.  Speech clear and cohesive.  Moves 4 extremity spontaneously.  Psychiatric: Affect normal, Judgment normal, Mood normal.       DIAGNOSTIC STUDIES / PROCEDURES    LABS  Results for orders placed or performed during the hospital encounter of 12/24/22   Group A Strep by PCR    Specimen: Throat   Result Value Ref Range    Group A Strep by PCR DETECTED (A) Not Detected       COURSE & MEDICAL DECISION MAKING  ED evaluation does demonstrate strep pharyngitis.  This is consistent with clinical exam.  Cannot exclude concomitant viral upper respiratory infection with copious nasal congestion and rhinorrhea, however paucity of cough, shortness of breath.  No fever on this exam, mild tachycardia is appropriate in this setting, never hypotensive or hypoxic.  Patient opted for Bicillin 1,200,000 units intramuscularly.  Encouraged to take OTC medications for other symptomatic relief.  No clinical evidence for peritonsillar abscess, retropharyngeal abscess, meningitis, otitis media, sinusitis, pneumonia.  Clinically well-appearing and nontoxic otherwise.    Patient is stable for discharge at this time, anticipatory guidance provided, close follow-up is encouraged, and strict ED return instructions have been detailed. Patient is agreeable to the disposition and plan.      FINAL IMPRESSION  (J02.0) Strep pharyngitis      Electronically  signed by: Luann Shultz D.O., 12/24/2022 11:54 AM      This dictation was created using voice recognition software. The accuracy of the dictation is limited to the abilities of the software. I expect there may be some errors of grammar and possibly content. The nursing notes were reviewed and certain aspects of this information were incorporated into this note.

## 2023-04-22 ENCOUNTER — HOSPITAL ENCOUNTER (EMERGENCY)
Facility: MEDICAL CENTER | Age: 23
End: 2023-04-22
Attending: EMERGENCY MEDICINE
Payer: MEDICAID

## 2023-04-22 ENCOUNTER — APPOINTMENT (OUTPATIENT)
Dept: RADIOLOGY | Facility: MEDICAL CENTER | Age: 23
End: 2023-04-22
Attending: EMERGENCY MEDICINE
Payer: MEDICAID

## 2023-04-22 VITALS
OXYGEN SATURATION: 98 % | SYSTOLIC BLOOD PRESSURE: 119 MMHG | RESPIRATION RATE: 16 BRPM | WEIGHT: 207.23 LBS | DIASTOLIC BLOOD PRESSURE: 78 MMHG | HEART RATE: 72 BPM | TEMPERATURE: 98.7 F | BODY MASS INDEX: 29.73 KG/M2

## 2023-04-22 DIAGNOSIS — R19.7 DIARRHEA, UNSPECIFIED TYPE: ICD-10-CM

## 2023-04-22 DIAGNOSIS — R11.2 NAUSEA AND VOMITING, UNSPECIFIED VOMITING TYPE: ICD-10-CM

## 2023-04-22 DIAGNOSIS — R10.84 GENERALIZED ABDOMINAL PAIN: ICD-10-CM

## 2023-04-22 LAB
ALBUMIN SERPL BCP-MCNC: 4.8 G/DL (ref 3.2–4.9)
ALBUMIN/GLOB SERPL: 2.1 G/DL
ALP SERPL-CCNC: 61 U/L (ref 30–99)
ALT SERPL-CCNC: 26 U/L (ref 2–50)
ANION GAP SERPL CALC-SCNC: 11 MMOL/L (ref 7–16)
APPEARANCE UR: CLEAR
AST SERPL-CCNC: 22 U/L (ref 12–45)
BASOPHILS # BLD AUTO: 0.9 % (ref 0–1.8)
BASOPHILS # BLD: 0.04 K/UL (ref 0–0.12)
BILIRUB SERPL-MCNC: 2.3 MG/DL (ref 0.1–1.5)
BILIRUB UR QL STRIP.AUTO: NEGATIVE
BUN SERPL-MCNC: 10 MG/DL (ref 8–22)
CALCIUM ALBUM COR SERPL-MCNC: 8.9 MG/DL (ref 8.5–10.5)
CALCIUM SERPL-MCNC: 9.5 MG/DL (ref 8.5–10.5)
CHLORIDE SERPL-SCNC: 104 MMOL/L (ref 96–112)
CO2 SERPL-SCNC: 25 MMOL/L (ref 20–33)
COLOR UR: YELLOW
CREAT SERPL-MCNC: 0.74 MG/DL (ref 0.5–1.4)
EOSINOPHIL # BLD AUTO: 0.02 K/UL (ref 0–0.51)
EOSINOPHIL NFR BLD: 0.4 % (ref 0–6.9)
ERYTHROCYTE [DISTWIDTH] IN BLOOD BY AUTOMATED COUNT: 37.2 FL (ref 35.9–50)
GFR SERPLBLD CREATININE-BSD FMLA CKD-EPI: 130 ML/MIN/1.73 M 2
GLOBULIN SER CALC-MCNC: 2.3 G/DL (ref 1.9–3.5)
GLUCOSE SERPL-MCNC: 96 MG/DL (ref 65–99)
GLUCOSE UR STRIP.AUTO-MCNC: NEGATIVE MG/DL
HCT VFR BLD AUTO: 47.9 % (ref 42–52)
HGB BLD-MCNC: 16.5 G/DL (ref 14–18)
IMM GRANULOCYTES # BLD AUTO: 0.01 K/UL (ref 0–0.11)
IMM GRANULOCYTES NFR BLD AUTO: 0.2 % (ref 0–0.9)
KETONES UR STRIP.AUTO-MCNC: NEGATIVE MG/DL
LEUKOCYTE ESTERASE UR QL STRIP.AUTO: NEGATIVE
LIPASE SERPL-CCNC: 23 U/L (ref 11–82)
LYMPHOCYTES # BLD AUTO: 1.49 K/UL (ref 1–4.8)
LYMPHOCYTES NFR BLD: 32 % (ref 22–41)
MCH RBC QN AUTO: 29.5 PG (ref 27–33)
MCHC RBC AUTO-ENTMCNC: 34.4 G/DL (ref 33.7–35.3)
MCV RBC AUTO: 85.7 FL (ref 81.4–97.8)
MICRO URNS: NORMAL
MONOCYTES # BLD AUTO: 0.33 K/UL (ref 0–0.85)
MONOCYTES NFR BLD AUTO: 7.1 % (ref 0–13.4)
NEUTROPHILS # BLD AUTO: 2.77 K/UL (ref 1.82–7.42)
NEUTROPHILS NFR BLD: 59.4 % (ref 44–72)
NITRITE UR QL STRIP.AUTO: NEGATIVE
NRBC # BLD AUTO: 0 K/UL
NRBC BLD-RTO: 0 /100 WBC
PH UR STRIP.AUTO: 8 [PH] (ref 5–8)
PLATELET # BLD AUTO: 283 K/UL (ref 164–446)
PMV BLD AUTO: 9.3 FL (ref 9–12.9)
POTASSIUM SERPL-SCNC: 4.5 MMOL/L (ref 3.6–5.5)
PROT SERPL-MCNC: 7.1 G/DL (ref 6–8.2)
PROT UR QL STRIP: NEGATIVE MG/DL
RBC # BLD AUTO: 5.59 M/UL (ref 4.7–6.1)
RBC UR QL AUTO: NEGATIVE
SODIUM SERPL-SCNC: 140 MMOL/L (ref 135–145)
SP GR UR STRIP.AUTO: 1.02
UROBILINOGEN UR STRIP.AUTO-MCNC: 1 MG/DL
WBC # BLD AUTO: 4.7 K/UL (ref 4.8–10.8)

## 2023-04-22 PROCEDURE — 83690 ASSAY OF LIPASE: CPT

## 2023-04-22 PROCEDURE — 700111 HCHG RX REV CODE 636 W/ 250 OVERRIDE (IP): Mod: UD | Performed by: EMERGENCY MEDICINE

## 2023-04-22 PROCEDURE — 700117 HCHG RX CONTRAST REV CODE 255: Performed by: EMERGENCY MEDICINE

## 2023-04-22 PROCEDURE — 85025 COMPLETE CBC W/AUTO DIFF WBC: CPT

## 2023-04-22 PROCEDURE — 74177 CT ABD & PELVIS W/CONTRAST: CPT

## 2023-04-22 PROCEDURE — 80053 COMPREHEN METABOLIC PANEL: CPT

## 2023-04-22 PROCEDURE — 81003 URINALYSIS AUTO W/O SCOPE: CPT

## 2023-04-22 PROCEDURE — 700105 HCHG RX REV CODE 258: Performed by: EMERGENCY MEDICINE

## 2023-04-22 PROCEDURE — 96374 THER/PROPH/DIAG INJ IV PUSH: CPT

## 2023-04-22 PROCEDURE — 99285 EMERGENCY DEPT VISIT HI MDM: CPT

## 2023-04-22 PROCEDURE — 36415 COLL VENOUS BLD VENIPUNCTURE: CPT

## 2023-04-22 RX ORDER — ONDANSETRON 4 MG/1
4 TABLET, ORALLY DISINTEGRATING ORAL EVERY 8 HOURS PRN
Qty: 10 TABLET | Refills: 0 | Status: SHIPPED | OUTPATIENT
Start: 2023-04-22 | End: 2023-04-25

## 2023-04-22 RX ORDER — SODIUM CHLORIDE 9 MG/ML
1000 INJECTION, SOLUTION INTRAVENOUS ONCE
Status: COMPLETED | OUTPATIENT
Start: 2023-04-22 | End: 2023-04-22

## 2023-04-22 RX ORDER — ONDANSETRON 2 MG/ML
4 INJECTION INTRAMUSCULAR; INTRAVENOUS ONCE
Status: COMPLETED | OUTPATIENT
Start: 2023-04-22 | End: 2023-04-22

## 2023-04-22 RX ADMIN — SODIUM CHLORIDE 1000 ML: 9 INJECTION, SOLUTION INTRAVENOUS at 11:36

## 2023-04-22 RX ADMIN — IOHEXOL 100 ML: 350 INJECTION, SOLUTION INTRAVENOUS at 12:49

## 2023-04-22 RX ADMIN — ONDANSETRON HYDROCHLORIDE 4 MG: 2 SOLUTION INTRAMUSCULAR; INTRAVENOUS at 11:35

## 2023-04-22 NOTE — DISCHARGE INSTRUCTIONS
Drink plenty of fluids.  Go home and rest.  Any worsening pain, blood in your vomit or stool or fevers return for recheck.  You should feel better in the next several days.

## 2023-04-22 NOTE — ED PROVIDER NOTES
ER Provider Note    Scribed for Madison Tyson M.d. by David Cárdenas. 4/22/2023  11:11 AM    Primary Care Provider: Pcp Pt States None    CHIEF COMPLAINT  Chief Complaint   Patient presents with    N/V    Abdominal Pain     X 3 days     Diarrhea     EXTERNAL RECORDS REVIEWED  Only records available are ER over the past 2 years.    HPI/ROS  LIMITATION TO HISTORY   Select: : None  OUTSIDE HISTORIAN(S):  None.    Fidel Sheriff is a 23 y.o. male who presents to the ED complaining of mid abdominal pain onset 3 days ago. He has associated nausea, vomiting, and diarrhea. His last episode of vomiting was this morning. He states that the first day his symptoms started, he was burping a lot, but has subsided. He has not been trying to eat, but has been able to tolerate fluids. He denies any chest pain or shortness of breath. He does mention that his daughter was previously sick, but has now gotten better. No past abdominal surgeries.     PAST MEDICAL HISTORY  History reviewed. No pertinent past medical history.    SURGICAL HISTORY  History reviewed. No pertinent surgical history.    FAMILY HISTORY  No family history noted.     SOCIAL HISTORY   reports that he has never smoked. He has never used smokeless tobacco. He reports that he does not currently use alcohol. He reports that he does not use drugs.    CURRENT MEDICATIONS  Previous Medications    IBUPROFEN (MOTRIN) 600 MG TAB    Take 1 Tab by mouth every 6 hours as needed.    IBUPROFEN (MOTRIN) 600 MG TAB    Take 1 Tab by mouth every 6 hours as needed.    LIDOCAINE (LIDODERM) 5 % PATCH    Apply 1 Patch to skin as directed every 24 hours.    LIDOCAINE (LIDODERM) 5 % PATCH    Apply 1 Patch to skin as directed every 24 hours.       ALLERGIES  Patient has no known allergies.    PHYSICAL EXAM  BP (!) 149/86   Pulse 76   Temp 37.2 °C (98.9 °F) (Temporal)   Resp 17   Wt 94 kg (207 lb 3.7 oz)   SpO2 99%   BMI 29.73 kg/m²   Constitutional: Well developed, No acute  distress, Non-toxic appearance.   HENT: Normocephalic, Atraumatic, Bilateral external ears normal, Nose normal.   Eyes: PERRL, EOMI, Conjunctiva normal  Neck: Normal range of motion, No tenderness, Supple  Cardiovascular: Normal heart rate, Normal rhythm  Thorax & Lungs: Normal breath sounds, No respiratory distress,   Abdomen: Diffuse periumbilical tenderness, no focal right lower quadrant tenderness, no guarding no rebound,no pulsatile mass, no distention  Skin: Warm, Dry, No erythema, No rash.   Back: No tenderness, No CVA tenderness.   Extremities: Intact distal pulses, No edema, No tenderness   Neurologic: Alert & oriented x 3, Normal motor function, Normal sensory function, No focal deficits noted.   Psychiatric: appropriate    DIAGNOSTIC STUDIES    Labs:   Results for orders placed or performed during the hospital encounter of 04/22/23   CBC WITH DIFFERENTIAL   Result Value Ref Range    WBC 4.7 (L) 4.8 - 10.8 K/uL    RBC 5.59 4.70 - 6.10 M/uL    Hemoglobin 16.5 14.0 - 18.0 g/dL    Hematocrit 47.9 42.0 - 52.0 %    MCV 85.7 81.4 - 97.8 fL    MCH 29.5 27.0 - 33.0 pg    MCHC 34.4 33.7 - 35.3 g/dL    RDW 37.2 35.9 - 50.0 fL    Platelet Count 283 164 - 446 K/uL    MPV 9.3 9.0 - 12.9 fL    Neutrophils-Polys 59.40 44.00 - 72.00 %    Lymphocytes 32.00 22.00 - 41.00 %    Monocytes 7.10 0.00 - 13.40 %    Eosinophils 0.40 0.00 - 6.90 %    Basophils 0.90 0.00 - 1.80 %    Immature Granulocytes 0.20 0.00 - 0.90 %    Nucleated RBC 0.00 /100 WBC    Neutrophils (Absolute) 2.77 1.82 - 7.42 K/uL    Lymphs (Absolute) 1.49 1.00 - 4.80 K/uL    Monos (Absolute) 0.33 0.00 - 0.85 K/uL    Eos (Absolute) 0.02 0.00 - 0.51 K/uL    Baso (Absolute) 0.04 0.00 - 0.12 K/uL    Immature Granulocytes (abs) 0.01 0.00 - 0.11 K/uL    NRBC (Absolute) 0.00 K/uL   COMP METABOLIC PANEL   Result Value Ref Range    Sodium 140 135 - 145 mmol/L    Potassium 4.5 3.6 - 5.5 mmol/L    Chloride 104 96 - 112 mmol/L    Co2 25 20 - 33 mmol/L    Anion Gap 11.0 7.0 -  16.0    Glucose 96 65 - 99 mg/dL    Bun 10 8 - 22 mg/dL    Creatinine 0.74 0.50 - 1.40 mg/dL    Calcium 9.5 8.5 - 10.5 mg/dL    AST(SGOT) 22 12 - 45 U/L    ALT(SGPT) 26 2 - 50 U/L    Alkaline Phosphatase 61 30 - 99 U/L    Total Bilirubin 2.3 (H) 0.1 - 1.5 mg/dL    Albumin 4.8 3.2 - 4.9 g/dL    Total Protein 7.1 6.0 - 8.2 g/dL    Globulin 2.3 1.9 - 3.5 g/dL    A-G Ratio 2.1 g/dL   LIPASE   Result Value Ref Range    Lipase 23 11 - 82 U/L   URINALYSIS    Specimen: Urine   Result Value Ref Range    Color Yellow     Character Clear     Specific Gravity 1.017 <1.035    Ph 8.0 5.0 - 8.0    Glucose Negative Negative mg/dL    Ketones Negative Negative mg/dL    Protein Negative Negative mg/dL    Bilirubin Negative Negative    Urobilinogen, Urine 1.0 Negative    Nitrite Negative Negative    Leukocyte Esterase Negative Negative    Occult Blood Negative Negative    Micro Urine Req see below    CORRECTED CALCIUM   Result Value Ref Range    Correct Calcium 8.9 8.5 - 10.5 mg/dL   ESTIMATED GFR   Result Value Ref Range    GFR (CKD-EPI) 130 >60 mL/min/1.73 m 2     Radiology:   The attending emergency physician has independently interpreted the diagnostic imaging associated with this visit and am waiting the final reading from the radiologist.   Preliminary interpretation is a follows: no free air  Radiologist interpretation:   CT-ABDOMEN-PELVIS WITH   Final Result      No acute abnormality in the abdomen or pelvis.         COURSE & MEDICAL DECISION MAKING     ED Observation Status? Yes; I am placing the patient in to an observation status due to a diagnostic uncertainty as well as therapeutic intensity. Patient placed in observation status at 10:25 AM, 4/22/2023.     Observation plan is as follows: Imaging and lab work for further evaluation, serial abdominal exams.    Upon Reevaluation, the patient's condition has: Improved; and will be discharged.    Patient discharged from ED Observation status at 12:54 PM (Time) 4/22/2023   (Date).     INITIAL ASSESSMENT, COURSE AND PLAN  Care Narrative: Patient presents to the emergency department with nausea vomiting and diarrhea.  He has some diffuse abdominal pain on exam.  No focal right lower quadrant tenderness to suggest appendicitis.  He is afebrile here.  He does look mildly dehydrated and therefore will give IV fluids as well as Zofran.  Will obtain labs to evaluate for electrolyte abnormalities due to the vomiting and diarrhea.  We will also evaluate for acute intra-abdominal process.    10:20 AM - Patient seen and examined at bedside. Discussed plan of care, including imaging and lab work. Patient agrees to the plan of care. Ordered for UA, Lipase, CMP, and CBC w/ Diff. to evaluate his symptoms. Differential diagnoses include, but are not limited to: Viral illness, Dehydration, Colitis, Doubt appendicitis. .     11:16 AM - Ordered for CT-Abdomen w/ to evaluate his symptoms. The patient will be resuscitated with 1L NS and medicated with Zofran 4 mg.     12:54 PM - Patient was reevaluated at bedside. Discussed lab and radiology results with the patient and informed them that they are reassuring. He states that he is feeling better.  Discussed discharge instructions and return precautions with the patient and they were cleared for discharge. Patient was given the opportunity to ask any further questions. He is comfortable with discharge at this time.       HYDRATION: Based on the patient's presentation of Acute Diarrhea and Acute Vomiting the patient was given IV fluids. IV Hydration was used because oral hydration was not adequate alone. Upon recheck following hydration, the patient was improved.    ADDITIONAL PROBLEM LIST  Elevated bilirubin.     DISPOSITION AND DISCUSSIONS    Patient presented with nausea vomiting diarrhea and abdominal pain.  There is an outbreak of this in the community.  Patient's daughter was also sick recently but has improved.  Patient has no evidence of a surgical  abdomen.  Also no evidence of a bowel obstruction.  Patient was given IV fluids and Zofran with improvement of his symptoms.  Laboratory studies show no leukocytosis.  Normal hemoglobin.  Normal liver function tests.  Normal electrolytes.  Bilirubin is slightly elevated at 2.3.  I advised him to follow-up with his primary care doctor concerning this.  I do not suspect a duct obstruction causing this.  CT of the abdomen was unremarkable for acute pathology.  Patient is able to tolerate p.o.  He has not had any further episodes of diarrhea vomiting here in the ER.  States he feels better and would like to go home.  Abdominal pain and dehydration precautions were given.    I have discussed management of the patient with the following physicians and STUART's:  None.     Discussion of management with other HP or appropriate source(s): None     Escalation of care considered, and ultimately not performed: acute inpatient care management, however at this time, the patient is most appropriate for outpatient management.    Barriers to care at this time, including but not limited to: Patient does not have established PCP.         FINAL DIANGOSIS  1. Nausea and vomiting, unspecified vomiting type    2. Generalized abdominal pain    3. Diarrhea, unspecified type           The note accurately reflects work and decisions made by me.  Madison Tyson M.D.  4/22/2023  1:37 PM     David AHN (Keren), am scribing for, and in the presence of, Madison Tyson M.D..    Electronically signed by: David Cárdenas (Keren), 4/22/2023    Madison AHN M.D. personally performed the services described in this documentation, as scribed by David Cárdenas in my presence, and it is both accurate and complete.

## 2023-04-22 NOTE — ED NOTES
Assist RN  Pt updated with the plan of care. Medicated per mar order. Allergies verified.  Urine collected, appears clear and yellowish. Specimen sent to lab.

## 2023-04-22 NOTE — ED NOTES
Pt states he has been having lower abdominal pain for 3 days, pt reports nausea in the morning followed by diarrhea daily.

## 2023-04-22 NOTE — Clinical Note
Fidel Sheriff was seen and treated in our emergency department on 4/22/2023.  He may return to work on 04/23/2023.       If you have any questions or concerns, please don't hesitate to call.      Madison Tyson M.D.

## 2023-04-22 NOTE — ED TRIAGE NOTES
.  Chief Complaint   Patient presents with    N/V    Abdominal Pain     X 3 days     Diarrhea     Ambulated to triage with above c/c

## 2023-05-20 ENCOUNTER — HOSPITAL ENCOUNTER (EMERGENCY)
Facility: MEDICAL CENTER | Age: 23
End: 2023-05-20
Attending: EMERGENCY MEDICINE
Payer: MEDICAID

## 2023-05-20 VITALS
HEIGHT: 69 IN | HEART RATE: 94 BPM | SYSTOLIC BLOOD PRESSURE: 127 MMHG | BODY MASS INDEX: 30.07 KG/M2 | RESPIRATION RATE: 15 BRPM | OXYGEN SATURATION: 95 % | DIASTOLIC BLOOD PRESSURE: 67 MMHG | TEMPERATURE: 97.7 F | WEIGHT: 203.04 LBS

## 2023-05-20 DIAGNOSIS — B35.3 TINEA PEDIS OF BOTH FEET: ICD-10-CM

## 2023-05-20 PROCEDURE — 99282 EMERGENCY DEPT VISIT SF MDM: CPT

## 2023-05-20 RX ORDER — CALCIUM CARB/VITAMIN D3/VIT K1 500-100-40
1 TABLET,CHEWABLE ORAL 2 TIMES DAILY
Qty: 130 G | Refills: 3 | Status: SHIPPED | OUTPATIENT
Start: 2023-05-20 | End: 2023-05-30

## 2023-05-20 ASSESSMENT — FIBROSIS 4 INDEX: FIB4 SCORE: 0.37

## 2023-05-20 NOTE — ED NOTES
Pt provided d/c instructions and verbalizes understanding with no further questions. Rx sent to pt's requested pharmacy. Home care instructions explained. Pt ambulatory to ED beata

## 2023-05-20 NOTE — ED TRIAGE NOTES
Chief Complaint   Patient presents with    Foot Pain     Patient reports itching, and peeling feet with pain x 2 weeks. Patient concerned he has athletes foot.

## 2023-05-22 ENCOUNTER — PATIENT OUTREACH (OUTPATIENT)
Dept: SCHEDULING | Facility: IMAGING CENTER | Age: 23
End: 2023-05-22
Payer: MEDICAID

## 2023-05-30 ENCOUNTER — OFFICE VISIT (OUTPATIENT)
Dept: MEDICAL GROUP | Facility: MEDICAL CENTER | Age: 23
End: 2023-05-30
Attending: FAMILY MEDICINE
Payer: MEDICAID

## 2023-05-30 VITALS
OXYGEN SATURATION: 96 % | WEIGHT: 212 LBS | TEMPERATURE: 98.2 F | HEIGHT: 69 IN | DIASTOLIC BLOOD PRESSURE: 60 MMHG | RESPIRATION RATE: 16 BRPM | HEART RATE: 80 BPM | BODY MASS INDEX: 31.4 KG/M2 | SYSTOLIC BLOOD PRESSURE: 108 MMHG

## 2023-05-30 DIAGNOSIS — B35.3 TINEA PEDIS OF BOTH FEET: ICD-10-CM

## 2023-05-30 PROCEDURE — 3074F SYST BP LT 130 MM HG: CPT | Performed by: FAMILY MEDICINE

## 2023-05-30 PROCEDURE — 99213 OFFICE O/P EST LOW 20 MIN: CPT | Performed by: FAMILY MEDICINE

## 2023-05-30 PROCEDURE — 99203 OFFICE O/P NEW LOW 30 MIN: CPT | Performed by: FAMILY MEDICINE

## 2023-05-30 PROCEDURE — 3078F DIAST BP <80 MM HG: CPT | Performed by: FAMILY MEDICINE

## 2023-05-30 RX ORDER — ITRACONAZOLE 100 MG/1
200 CAPSULE ORAL 2 TIMES DAILY
Qty: 28 CAPSULE | Refills: 0 | Status: SHIPPED | OUTPATIENT
Start: 2023-05-30 | End: 2023-06-06

## 2023-05-30 ASSESSMENT — PATIENT HEALTH QUESTIONNAIRE - PHQ9: CLINICAL INTERPRETATION OF PHQ2 SCORE: 0

## 2023-05-30 ASSESSMENT — FIBROSIS 4 INDEX: FIB4 SCORE: 0.37

## 2023-05-30 NOTE — LETTER
May 30, 2023    To Whom It May Concern:         This is confirmation that Fidel Phippsuirre attended his scheduled appointment with Sarai Levin M.D. on 5/30/23. It is recommended that he be excused from work through 6/6/23 to allow for treatment of current condition.         If you have any questions please do not hesitate to call me at the phone number listed below.    Sincerely,          Sarai Levin M.D.  [phone]

## 2023-05-30 NOTE — PROGRESS NOTES
Subjective:     CC:    Chief Complaint   Patient presents with    Establish Care    Foot Problem     Bilateral foot pain rt worse       HISTORY OF THE PRESENT ILLNESS: Patient is a 23 y.o. male. This pleasant patient is here today to establish primary care with me and discuss the following issues.   Denies any previous routine adult primary care    Specialists   None    Athlete's foot  Beginning of May noticed more swelling in toe area and surrounding skin with cracking and bleeding. Seen in the ER and was prescribed tinactin. States that the OTC cream and spray that he has been using actually caused more issues with cracks and cuts. Works 12 hour shifts at [employer] as . Wears steel toes shoes and is on his feet for entire shift. Has been having issues maintaining dry area to his foot. Main area of concern is between 4th and 5th digit on right foot as well as between 1st and 2nd toe of left foot which have caused him significant pain and discomfort      Allergies: Patient has no known allergies.      [id number] - [place], [place] - [address] [address] [address] [facility] [address]  Phone: [phone] Fax: [phone]      Current Outpatient Medications   Medication Sig Dispense Refill    itraconazole (SPORONAX) 100 MG Cap Take 2 Capsules by mouth 2 times a day for 7 days. 28 Capsule 0     No current facility-administered medications for this visit.       History reviewed. No pertinent past medical history.    Past Surgical History:   Procedure Laterality Date    TONSILLECTOMY  2011       Social History     Tobacco Use    Smoking status: Never    Smokeless tobacco: Never   Vaping Use    Vaping Use: Never used   Substance Use Topics    Alcohol use: Yes     Comment: occasionally; once every 4 months    Drug use: Never       Family History   Problem Relation Age of Onset    Diabetes Mother     No Known Problems Father     Diabetes Sister     Diabetes Brother   "   No Known Problems Daughter     No Known Problems Daughter     No Known Problems Daughter     Cancer Neg Hx     Heart Disease Neg Hx        ROS      Objective:     /60   Pulse 80   Temp 36.8 °C (98.2 °F)   Resp 16   Ht 1.753 m (5' 9.02\")   Wt 96.2 kg (212 lb)   SpO2 96%   BMI 31.29 kg/m²   Physical Exam  Musculoskeletal:        Feet:    Feet:      Right foot:      Skin integrity: Skin breakdown and fissure present.      Left foot:      Skin integrity: Skin breakdown and fissure present.      Comments: Scaly, moist, rash between interdigit of outlined area      Constitutional: Alert, no distress  Eye: Conjunctiva clear, lids normal. Wears glasses  Respiratory: Unlabored respiratory effort, no cough, lungs clear to auscultation bilaterally  CV: RRR  Psych: Alert and oriented x3, normal affect and mood      Assessment & Plan:   23 y.o. male with the following -    1. Tinea pedis of both feet  - PE consistent with tinea/athlete's foot. Likely exacerbated by current work conditions preventing healing process  - Recommend trial with oral therapy given complications from topical therapy  - itraconazole (SPORONAX) 100 MG Cap; Take 2 Capsules by mouth 2 times a day for 7 days.  Dispense: 28 Capsule; Refill: 0  - Recommend temporary leave from work to allow for treatment. Work note provided with anticipated return following 6/6/23  - Orders as noted below for exclusionary, confirmatory, and risk stratification purposes:  - Lipid Profile; Future  - HEMOGLOBIN A1C; Future    Return in about 4 weeks (around 6/27/2023) for lab follow up.    Please note that this dictation was created using voice recognition software. I have made every reasonable attempt to correct obvious errors, but I expect that there are errors of grammar and possibly content that I did not discover before finalizing the note.  "

## 2023-05-30 NOTE — ASSESSMENT & PLAN NOTE
Beginning of May noticed more swelling in toe area and surrounding skin with cracking and bleeding. Seen in the ER and was prescribed tinactin. States that the OTC cream and spray that he has been using actually caused more issues with cracks and cuts. Works 12 hour shifts at [employer] as . Wears steel toes shoes and is on his feet for entire shift. Has been having issues maintaining dry area to his foot. Main area of concern is between 4th and 5th digit on right foot as well as between 1st and 2nd toe of left foot which have caused him significant pain and discomfort

## 2023-06-05 ENCOUNTER — PATIENT MESSAGE (OUTPATIENT)
Dept: MEDICAL GROUP | Facility: MEDICAL CENTER | Age: 23
End: 2023-06-05
Payer: MEDICAID

## 2023-06-05 DIAGNOSIS — B35.3 TINEA PEDIS OF BOTH FEET: ICD-10-CM

## 2023-06-14 ENCOUNTER — HOSPITAL ENCOUNTER (EMERGENCY)
Facility: MEDICAL CENTER | Age: 23
End: 2023-06-14
Attending: EMERGENCY MEDICINE
Payer: MEDICAID

## 2023-06-14 VITALS
RESPIRATION RATE: 14 BRPM | SYSTOLIC BLOOD PRESSURE: 128 MMHG | DIASTOLIC BLOOD PRESSURE: 70 MMHG | BODY MASS INDEX: 32.49 KG/M2 | HEIGHT: 69 IN | HEART RATE: 92 BPM | TEMPERATURE: 97.8 F | WEIGHT: 219.36 LBS | OXYGEN SATURATION: 98 %

## 2023-06-14 DIAGNOSIS — S90.424A TOE BLISTER WITHOUT INFECTION, RIGHT, INITIAL ENCOUNTER: ICD-10-CM

## 2023-06-14 DIAGNOSIS — B35.3 TINEA PEDIS OF RIGHT FOOT: ICD-10-CM

## 2023-06-14 PROCEDURE — 99282 EMERGENCY DEPT VISIT SF MDM: CPT

## 2023-06-14 RX ORDER — PRENATAL VIT 91/IRON/FOLIC/DHA 28-975-200
1 COMBINATION PACKAGE (EA) ORAL 2 TIMES DAILY
Qty: 15 G | Refills: 0 | Status: SHIPPED | OUTPATIENT
Start: 2023-06-14 | End: 2023-06-16 | Stop reason: SDUPTHER

## 2023-06-14 ASSESSMENT — FIBROSIS 4 INDEX: FIB4 SCORE: 0.37

## 2023-06-14 ASSESSMENT — PAIN DESCRIPTION - DESCRIPTORS: DESCRIPTORS: ACHING

## 2023-06-14 NOTE — ED PROVIDER NOTES
ED Provider Note    CHIEF COMPLAINT  Chief Complaint   Patient presents with    Toe Pain     Pt states he has been wearing ill fitting shoes for 12 hr shifts and his toes have been rubbing against the shoe causing blisters.  Pain 7/10       EXTERNAL RECORDS REVIEWED  For evaluation of pain and redness with blistering to the right fourth toe    HPI/ROS  LIMITATION TO HISTORY   Reviewed previous visit history  OUTSIDE HISTORIAN(S):  None    Fidel Leger is a 23 y.o. male who presents for evaluation of pain and blistering to the right fourth toe.  The patient has a history of athlete's foot/onychomycosis.  He had been usingTinactin spray.  He works long shifts on his feet and steel toed boots with no ventilation and reports that he had difficulty getting his fungal infection under control he is also had some skin breakdown in between the toes.  The patient has no significant medical history.  Notably is not a diabetic.  He reports he has had trouble having the skin heal up.  No high fevers or chills    PAST MEDICAL HISTORY   No significant medical history    SURGICAL HISTORY   has a past surgical history that includes tonsillectomy (2011).    FAMILY HISTORY  Family History   Problem Relation Age of Onset    Diabetes Mother     No Known Problems Father     Diabetes Sister     Diabetes Brother     No Known Problems Daughter     No Known Problems Daughter     No Known Problems Daughter     Cancer Neg Hx     Heart Disease Neg Hx        SOCIAL HISTORY  Social History     Tobacco Use    Smoking status: Never    Smokeless tobacco: Never   Vaping Use    Vaping Use: Never used   Substance and Sexual Activity    Alcohol use: Yes     Comment: occasionally; once every 4 months    Drug use: Never    Sexual activity: Not Currently     Partners: Female     Birth control/protection: Condom Male       CURRENT MEDICATIONS  Home Medications       Reviewed by Ish Vora R.N. (Registered Nurse) on 06/14/23 at 1404  Med  "List Status: Not Addressed     Medication Last Dose Status        Patient Dima Taking any Medications                           ALLERGIES  No Known Allergies    PHYSICAL EXAM  VITAL SIGNS: /64   Pulse (!) 104   Temp 36.2 °C (97.1 °F) (Temporal)   Resp 16   Ht 1.753 m (5' 9\")   Wt 99.5 kg (219 lb 5.7 oz)   SpO2 97%   BMI 32.39 kg/m²    Constitutional: Alert and oriented x 3, no acute distress.  HENT: Normocephalic, Atraumatic, Bilateral external ears normal. Nose normal.   Eyes: Pupils are equal and reactive. Conjunctiva normal, non-icteric.   Heart: Regular rate and rythm,   Lungs: No respiratory distress  GI: Soft nontender nondistended   Skin: Warm, Dry, No erythema, No rash.   Neurologic: Cranial nerves III through XII grossly intact no sensory deficit no cerebellar dysfunction.   Extremities: Right foot exam is notable for subtle erythema with blistering and skin breakdown on the lateral and medial aspect of the right fourth toe.  There is no necrosis there is superficial ulceration but no exposed tendon or bone.  There are cutaneous findings consistent with onychomycosis  Psychiatric: Appropriate affect for situation      DIAGNOSTIC STUDIES / PROCEDURES    LABS  Considered but not clinically indicated    RADIOLOGY  None performed  COURSE & MEDICAL DECISION MAKING    ED Observation Status? No; Patient does not meet criteria for ED Observation.     INITIAL ASSESSMENT, COURSE AND PLAN  Care Narrative:     This is a very pleasant 23-year-old gentleman with a history of onychomycosis who presents here with skin breakdown primarily on the right fourth digit.  There is no evidence of bacterial secondary infection as this appears to be superficial skin breakdown related to localized friction with concomitant onychomycosis.  I will prescribe Lamisil and provide the patient with some basic wound dressings to facilitate healing.  I counseled him to ventilate his feet and to get appropriate fitting shoes    "   ADDITIONAL PROBLEM LIST    DISPOSITION AND DISCUSSIONS  I have discussed management of the patient with the following physicians and STUART's: None    Discussion of management with other Q or appropriate source(s): None    Escalation of care considered, and ultimately not performed:blood analysis    Barriers to care at this time, including but not limited to: None    Decision tools and prescription drugs considered including, but not limited to: None    FINAL DIAGNOSIS  1. Toe blister without infection, right, initial encounter        2. Tinea pedis of right foot  terbinafine (LAMISIL) 1 % cream               Electronically signed by: Sunil Marie M.D., 6/14/2023 2:21 PM

## 2023-06-14 NOTE — ED TRIAGE NOTES
"Chief Complaint   Patient presents with    Toe Pain     Pt states he has been wearing ill fitting shoes for 12 hr shifts and his toes have been rubbing against the shoe causing blisters.  Pain 7/10     /64   Pulse (!) 104   Temp 36.2 °C (97.1 °F) (Temporal)   Resp 16   Ht 1.753 m (5' 9\")   Wt 99.5 kg (219 lb 5.7 oz)   SpO2 97%   BMI 32.39 kg/m²     "

## 2023-06-14 NOTE — Clinical Note
Fidel Leger was seen and treated in our emergency department on 6/14/2023.  He may return to work on 06/15/2023.  Patient will need 15 minutes twice a day to address blisters in the right foot     If you have any questions or concerns, please don't hesitate to call.      Sunil Marie M.D.

## 2023-06-14 NOTE — ED NOTES
Pt has discharge orders. Pt educated on discharge instructions and new prescriptions.  Pt verbalizes understanding.  Pt provided with work note. Pt ambulatory to lobby with steady gait. Pt going home with self.

## 2023-06-16 ENCOUNTER — HOSPITAL ENCOUNTER (EMERGENCY)
Facility: MEDICAL CENTER | Age: 23
End: 2023-06-16
Attending: EMERGENCY MEDICINE
Payer: MEDICAID

## 2023-06-16 VITALS
RESPIRATION RATE: 16 BRPM | HEIGHT: 69 IN | WEIGHT: 216.93 LBS | TEMPERATURE: 99.1 F | DIASTOLIC BLOOD PRESSURE: 65 MMHG | HEART RATE: 99 BPM | SYSTOLIC BLOOD PRESSURE: 115 MMHG | BODY MASS INDEX: 32.13 KG/M2 | OXYGEN SATURATION: 98 %

## 2023-06-16 DIAGNOSIS — L03.031 CELLULITIS OF FOURTH TOE OF RIGHT FOOT: ICD-10-CM

## 2023-06-16 DIAGNOSIS — B35.3 TINEA PEDIS OF RIGHT FOOT: ICD-10-CM

## 2023-06-16 LAB — GLUCOSE BLD STRIP.AUTO-MCNC: 104 MG/DL (ref 65–99)

## 2023-06-16 PROCEDURE — 99282 EMERGENCY DEPT VISIT SF MDM: CPT

## 2023-06-16 PROCEDURE — 82962 GLUCOSE BLOOD TEST: CPT

## 2023-06-16 RX ORDER — PRENATAL VIT 91/IRON/FOLIC/DHA 28-975-200
1 COMBINATION PACKAGE (EA) ORAL 2 TIMES DAILY
Qty: 15 G | Refills: 0 | Status: SHIPPED | OUTPATIENT
Start: 2023-06-16

## 2023-06-16 RX ORDER — CEPHALEXIN 500 MG/1
500 CAPSULE ORAL 4 TIMES DAILY
Qty: 28 CAPSULE | Refills: 0 | Status: ACTIVE | OUTPATIENT
Start: 2023-06-16 | End: 2023-06-23

## 2023-06-16 ASSESSMENT — FIBROSIS 4 INDEX: FIB4 SCORE: 0.37

## 2023-06-16 NOTE — Clinical Note
Fidel Leger was seen and treated in our emergency department on 6/16/2023.  He may return to work on 06/18/2023.  Must try to keep his feet dry.  He needs time off to change his socks periodically.     If you have any questions or concerns, please don't hesitate to call.      Madison Tyson M.D.

## 2023-06-16 NOTE — ED TRIAGE NOTES
"Chief Complaint   Patient presents with    Digit Pain     Pt was seen here 2 days ago for pain and skin breakdown of the R fourth digit of the foot. Pt was dx with friction injury from his shoe as well as fungal infection. Pt prescribed lamisil but was not able to  the rx yet. Pt now states the pain and skin breakdown is worse today.      Pt ambulatory to triage for above complaints, VSS on RA, GCS 15, NAD.    Pt returned to lobby. Educated on triage process and to inform staff of any changes.     /62   Pulse (!) 104   Temp 36.8 °C (98.2 °F) (Temporal)   Resp 16   Ht 1.753 m (5' 9\")   Wt 98.4 kg (216 lb 14.9 oz)   SpO2 95%   BMI 32.04 kg/m²     "

## 2023-06-17 NOTE — ED NOTES
[facility] 104  
Patient ambulatory to PUR 79 with a steady gait. Chart up for ERP to see.   
Pt given d/c instructions, f/u info and aware of RX x 2 for  with verbal understanding.  VSS at discharge.  Pt ambulatory from the ED w/ steady gait.  All belongings in possession on discharge.  Pt escorted to the lobby by RN.    
n/a

## 2023-06-17 NOTE — ED PROVIDER NOTES
ED Provider Note    CHIEF COMPLAINT  Chief Complaint   Patient presents with    Digit Pain     Pt was seen here 2 days ago for pain and skin breakdown of the R fourth digit of the foot. Pt was dx with friction injury from his shoe as well as fungal infection. Pt prescribed lamisil but was not able to  the rx yet. Pt now states the pain and skin breakdown is worse today.        EXTERNAL RECORDS REVIEWED  Other seen here in the emergency department on 6/14 for the same complaint .  She did not fill his prescription.    HPI/ROS  LIMITATION TO HISTORY   Select: : None      Fidel Leger is a 23 y.o. male who presents with skin breakdown and pain in his right fourth toe.  Recently diagnosed with a fungal infection as well as a friction injury to his toe.  Prescribed Lamisil but did not pick it up yet.  States he is having increased pain and skin breakdown today.  Denies fevers.  No history of diabetes.  Wears steel toe shoes at work and states his feet get quite sweaty.  No numbness or tingling to his toes.  No calf swelling or pain.  No recent trauma.    PAST MEDICAL HISTORY   None    SURGICAL HISTORY   has a past surgical history that includes tonsillectomy (2011).    FAMILY HISTORY  Family History   Problem Relation Age of Onset    Diabetes Mother     No Known Problems Father     Diabetes Sister     Diabetes Brother     No Known Problems Daughter     No Known Problems Daughter     No Known Problems Daughter     Cancer Neg Hx     Heart Disease Neg Hx        SOCIAL HISTORY  Social History     Tobacco Use    Smoking status: Never    Smokeless tobacco: Never   Vaping Use    Vaping Use: Never used   Substance and Sexual Activity    Alcohol use: Yes     Comment: occasionally; once every 4 months    Drug use: Never    Sexual activity: Not Currently     Partners: Female     Birth control/protection: Condom Male       CURRENT MEDICATIONS  Home Medications       Reviewed by Hardeep Zaragoaz R.N. (Registered  "Nurse) on 06/16/23 at 1625  Med List Status: Not Addressed     Medication Last Dose Status   terbinafine (LAMISIL) 1 % cream  Active                    ALLERGIES  No Known Allergies    PHYSICAL EXAM  VITAL SIGNS: /62   Pulse (!) 104   Temp 36.8 °C (98.2 °F) (Temporal)   Resp 16   Ht 1.753 m (5' 9\")   Wt 98.4 kg (216 lb 14.9 oz)   SpO2 95%   BMI 32.04 kg/m²      Constitutional: , Alert in no apparent distress.  HENT: Normocephalic, Bilateral external ears normal. Nose normal.   Eyes: Pupils are equal and reactive. Conjunctiva normal, non-icteric.   Thorax & Lungs: Easy unlabored respirations  Abdomen:  No gross signs of peritonitis, no pain with movement   Skin: Visualized skin is  Dry, right foot exam shows subtle erythema with desquamation of the skin of the fourth toe.  There is a superficial ulceration on the lateral medial aspect of the fourth toe.  No active drainage.  There is some slight redness that extends onto the dorsum of the foot.  Continuous findings consistent with onychomycosis  Extremities:   No edema, No asymmetry  Neurologic: Alert, Grossly non-focal.   Psychiatric: Affect and Mood normal         DIAGNOSTIC STUDIES / PROCEDURES    Accu-Chek was 104    COURSE & MEDICAL DECISION MAKING    ED Observation Status? No; Patient does not meet criteria for ED Observation.     INITIAL ASSESSMENT, COURSE AND PLAN  Care Narrative: Presents to the emergency department for reevaluation of a foot wound.  Patient was unable to fill his prescription and asked that I send it to a different pharmacy.  There is a small ulceration to the toe that is not healing.  There is some slight redness now and I think it would benefit from antibiotics.  We discussed treatment for foot fungus.  I have advised him to try to stay out of his boots is much as possible.  Try to air out his feet.  To try to keep his feet as dry as possible at work.  He is advised to continue using the cream but do not place the fungal " cream in the open wound.  He is to watch the wound closely.  Any increased redness while taking the antibiotics or fever he should return for recheck.  I have written him off for the next 2 days to see if he can start getting his wound healed.    Wound care instructions were given.  Return precautions were given.      DISPOSITION AND DISCUSSIONS      Escalation of care considered, and ultimately not performed:blood analysis    Barriers to care at this time, including but not limited to: Patient does not have established PCP.     Decision tools and prescription drugs considered including, but not limited to: Antibiotics for skin infection .    Patient discharged in stable condition.    FINAL DIAGNOSIS  1. Tinea pedis of right foot    2. Cellulitis of fourth toe of right foot           Electronically signed by: Madison Tyson M.D., 6/16/2023 5:08 PM

## 2023-06-17 NOTE — DISCHARGE INSTRUCTIONS
Try to keep your feet as dry as possible.  Take the medications as directed.  Any increased redness drainage or concerns return.

## 2023-06-22 ENCOUNTER — HOSPITAL ENCOUNTER (EMERGENCY)
Facility: MEDICAL CENTER | Age: 23
End: 2023-06-22
Attending: EMERGENCY MEDICINE
Payer: MEDICAID

## 2023-06-22 VITALS
RESPIRATION RATE: 16 BRPM | WEIGHT: 229.28 LBS | TEMPERATURE: 99.2 F | HEIGHT: 69 IN | HEART RATE: 66 BPM | BODY MASS INDEX: 33.96 KG/M2 | SYSTOLIC BLOOD PRESSURE: 133 MMHG | DIASTOLIC BLOOD PRESSURE: 72 MMHG | OXYGEN SATURATION: 97 %

## 2023-06-22 DIAGNOSIS — R19.7 DIARRHEA, UNSPECIFIED TYPE: ICD-10-CM

## 2023-06-22 DIAGNOSIS — R11.2 NAUSEA AND VOMITING, UNSPECIFIED VOMITING TYPE: ICD-10-CM

## 2023-06-22 LAB
ALBUMIN SERPL BCP-MCNC: 4.5 G/DL (ref 3.2–4.9)
ALBUMIN/GLOB SERPL: 1.9 G/DL
ALP SERPL-CCNC: 64 U/L (ref 30–99)
ALT SERPL-CCNC: 70 U/L (ref 2–50)
ANION GAP SERPL CALC-SCNC: 15 MMOL/L (ref 7–16)
AST SERPL-CCNC: 43 U/L (ref 12–45)
BASOPHILS # BLD AUTO: 0.7 % (ref 0–1.8)
BASOPHILS # BLD: 0.05 K/UL (ref 0–0.12)
BILIRUB SERPL-MCNC: 0.9 MG/DL (ref 0.1–1.5)
BUN SERPL-MCNC: 9 MG/DL (ref 8–22)
CALCIUM ALBUM COR SERPL-MCNC: 8.8 MG/DL (ref 8.5–10.5)
CALCIUM SERPL-MCNC: 9.2 MG/DL (ref 8.5–10.5)
CHLORIDE SERPL-SCNC: 101 MMOL/L (ref 96–112)
CO2 SERPL-SCNC: 24 MMOL/L (ref 20–33)
CREAT SERPL-MCNC: 0.68 MG/DL (ref 0.5–1.4)
EOSINOPHIL # BLD AUTO: 0.15 K/UL (ref 0–0.51)
EOSINOPHIL NFR BLD: 2.2 % (ref 0–6.9)
ERYTHROCYTE [DISTWIDTH] IN BLOOD BY AUTOMATED COUNT: 38.4 FL (ref 35.9–50)
GFR SERPLBLD CREATININE-BSD FMLA CKD-EPI: 133 ML/MIN/1.73 M 2
GLOBULIN SER CALC-MCNC: 2.4 G/DL (ref 1.9–3.5)
GLUCOSE SERPL-MCNC: 106 MG/DL (ref 65–99)
HCT VFR BLD AUTO: 43 % (ref 42–52)
HGB BLD-MCNC: 14.9 G/DL (ref 14–18)
IMM GRANULOCYTES # BLD AUTO: 0.06 K/UL (ref 0–0.11)
IMM GRANULOCYTES NFR BLD AUTO: 0.9 % (ref 0–0.9)
LIPASE SERPL-CCNC: 33 U/L (ref 11–82)
LYMPHOCYTES # BLD AUTO: 1.51 K/UL (ref 1–4.8)
LYMPHOCYTES NFR BLD: 22.2 % (ref 22–41)
MCH RBC QN AUTO: 29.7 PG (ref 27–33)
MCHC RBC AUTO-ENTMCNC: 34.7 G/DL (ref 32.3–36.5)
MCV RBC AUTO: 85.8 FL (ref 81.4–97.8)
MONOCYTES # BLD AUTO: 0.54 K/UL (ref 0–0.85)
MONOCYTES NFR BLD AUTO: 7.9 % (ref 0–13.4)
NEUTROPHILS # BLD AUTO: 4.49 K/UL (ref 1.82–7.42)
NEUTROPHILS NFR BLD: 66.1 % (ref 44–72)
NRBC # BLD AUTO: 0 K/UL
NRBC BLD-RTO: 0 /100 WBC (ref 0–0.2)
PLATELET # BLD AUTO: 302 K/UL (ref 164–446)
PMV BLD AUTO: 9.7 FL (ref 9–12.9)
POTASSIUM SERPL-SCNC: 4 MMOL/L (ref 3.6–5.5)
PROT SERPL-MCNC: 6.9 G/DL (ref 6–8.2)
RBC # BLD AUTO: 5.01 M/UL (ref 4.7–6.1)
SODIUM SERPL-SCNC: 140 MMOL/L (ref 135–145)
WBC # BLD AUTO: 6.8 K/UL (ref 4.8–10.8)

## 2023-06-22 PROCEDURE — 96374 THER/PROPH/DIAG INJ IV PUSH: CPT

## 2023-06-22 PROCEDURE — 85025 COMPLETE CBC W/AUTO DIFF WBC: CPT

## 2023-06-22 PROCEDURE — 700111 HCHG RX REV CODE 636 W/ 250 OVERRIDE (IP): Mod: UD | Performed by: EMERGENCY MEDICINE

## 2023-06-22 PROCEDURE — 99284 EMERGENCY DEPT VISIT MOD MDM: CPT

## 2023-06-22 PROCEDURE — 83690 ASSAY OF LIPASE: CPT

## 2023-06-22 PROCEDURE — 80053 COMPREHEN METABOLIC PANEL: CPT

## 2023-06-22 PROCEDURE — 36415 COLL VENOUS BLD VENIPUNCTURE: CPT

## 2023-06-22 PROCEDURE — 96372 THER/PROPH/DIAG INJ SC/IM: CPT

## 2023-06-22 RX ORDER — DICYCLOMINE HCL 20 MG
20 TABLET ORAL EVERY 6 HOURS PRN
Qty: 60 TABLET | Refills: 0 | Status: SHIPPED | OUTPATIENT
Start: 2023-06-22

## 2023-06-22 RX ORDER — ONDANSETRON 4 MG/1
4 TABLET, ORALLY DISINTEGRATING ORAL EVERY 6 HOURS PRN
Qty: 20 TABLET | Refills: 0 | Status: SHIPPED | OUTPATIENT
Start: 2023-06-22

## 2023-06-22 RX ORDER — DICYCLOMINE HYDROCHLORIDE 10 MG/ML
20 INJECTION INTRAMUSCULAR ONCE
Status: COMPLETED | OUTPATIENT
Start: 2023-06-22 | End: 2023-06-22

## 2023-06-22 RX ORDER — ONDANSETRON 2 MG/ML
4 INJECTION INTRAMUSCULAR; INTRAVENOUS ONCE
Status: COMPLETED | OUTPATIENT
Start: 2023-06-22 | End: 2023-06-22

## 2023-06-22 RX ADMIN — ONDANSETRON 4 MG: 2 INJECTION INTRAMUSCULAR; INTRAVENOUS at 07:51

## 2023-06-22 RX ADMIN — DICYCLOMINE HYDROCHLORIDE 20 MG: 20 INJECTION, SOLUTION INTRAMUSCULAR at 08:07

## 2023-06-22 ASSESSMENT — FIBROSIS 4 INDEX: FIB4 SCORE: 0.37

## 2023-06-22 NOTE — ED TRIAGE NOTES
Chief Complaint   Patient presents with    Nausea/Vomiting/Diarrhea   Since yesterday afternoon, pt ate enchiladas and believes he may have gotten food poisoning, pt tolerating fluids. Last emesis 1 hour ago, pt describes emesis as digested food. Pt denies fevers and bloody stool or emesis. Pt reports mild abd discomfort as well.     Pt ambulatory to triage for above complaint.      Pt is alert/oriented and follows commands. Pt speaking in full sentences and responds appropriately to questions. No acute distress noted in triage and respirations are even and unlabored.     Pt placed in lobby and educated on triage process. Pt encouraged to alert staff for any changes in condition.

## 2023-06-22 NOTE — ED PROVIDER NOTES
ED Provider Note  Primary care provider: Sarai Levin M.D.  Means of arrival: private vehicle  History obtained from: patient  History limited by: none    CHIEF COMPLAINT  Chief Complaint   Patient presents with    Nausea/Vomiting/Diarrhea       HPI/ROS  Fidel Leger is a 23 y.o. male who presents to the Emergency Department for evaluation of abdominal pain, nausea and vomiting.  Patient reports that last night he went out to eat and since then has had persistent generalized abdominal cramping that is mild in severity with associated nausea, vomiting and diarrhea.  He denies fevers, chills, chest pain or shortness of breath.  He has no prior history of abdominal surgeries.    EXTERNAL RECORDS REVIEWED  None pertinent     LIMITATION TO HISTORY   none    OUTSIDE HISTORIAN(S):  none      PAST MEDICAL HISTORY   none    SURGICAL HISTORY   has a past surgical history that includes tonsillectomy (2011).    SOCIAL HISTORY  Social History     Tobacco Use    Smoking status: Never    Smokeless tobacco: Never   Vaping Use    Vaping Use: Never used   Substance Use Topics    Alcohol use: Yes     Comment: occasionally; once every 4 months    Drug use: Never      Social History     Substance and Sexual Activity   Drug Use Never       FAMILY HISTORY  Family History   Problem Relation Age of Onset    Diabetes Mother     No Known Problems Father     Diabetes Sister     Diabetes Brother     No Known Problems Daughter     No Known Problems Daughter     No Known Problems Daughter     Cancer Neg Hx     Heart Disease Neg Hx        CURRENT MEDICATIONS  Home Medications       Reviewed by Lizandro Perez R.N. (Registered Nurse) on 06/22/23 at 0542  Med List Status: <None>     Medication Last Dose Status   cephALEXin (KEFLEX) 500 MG Cap  Active   terbinafine (LAMISIL) 1 % cream  Active                    ALLERGIES  No Known Allergies    PHYSICAL EXAM  VITAL SIGNS: /77   Pulse 70   Temp 36.4 °C (97.5 °F) (Temporal)  "  Resp 16   Ht 1.753 m (5' 9\")   Wt 104 kg (229 lb 4.5 oz)   SpO2 96%   BMI 33.86 kg/m²   Vitals reviewed by myself.  Physical Exam  Nursing note and vitals reviewed.  Constitutional: Well-developed and well-nourished. No acute distress.   HENT: Head is normocephalic and atraumatic.  Eyes: extra-ocular movements intact  Cardiovascular: regular rate and regular rhythm. No murmur heard.  Pulmonary/Chest: Breath sounds normal. No wheezes or rales.   Abdominal: Soft and non-tender. No distention.  Negative [place] sign, negative [place]'s point tenderness, no rebound or guarding  Musculoskeletal: Extremities exhibit normal range of motion without edema or tenderness.   Neurological: Awake and alert  Skin: Skin is warm and dry. No rash.         DIAGNOSTIC STUDIES:  LABS  Labs Reviewed   COMP METABOLIC PANEL - Abnormal; Notable for the following components:       Result Value    Glucose 106 (*)     ALT(SGPT) 70 (*)     All other components within normal limits   CBC WITH DIFFERENTIAL   LIPASE   CORRECTED CALCIUM   ESTIMATED GFR       All labs reviewed and independently interpreted by myself        COURSE & MEDICAL DECISION MAKING    ED Observation Status? Yes; I am placing the patient in to an observation status due to a diagnostic uncertainty as well as therapeutic intensity. Patient placed in observation status at 7:25 AM, 6/22/2023.     Observation plan is as follows: Follow-up labs and imaging    Upon Reevaluation, the patient's condition has: Improved; and will be discharged.    Patient discharged from ED Observation status at 8:57 AM on 6/22/2023    INITIAL ASSESSMENT AND PLAN    Patient is a 23-year-old male who presents for evaluation of nausea, vomiting and diarrhea.  Differential diagnosis includes viral gastroenteritis, food poisoning, foodborne illness, electrolyte derangement, dehydration.  Low suspicion for acute intra-abdominal bacterial pathology such as appendicitis or biliary pathology as patient's " abdominal exam is benign.  Will obtain labs for further evaluation, will hold on imaging unless labs are grossly abnormal or abdominal exam changes.       ER COURSE AND FINAL DISPOSITION   Patient's initial vitals are within normal limits.  He is treated with Zofran and Bentyl for his symptoms.  After treatment patient is feeling greatly improved and is able to tolerate oral intake.    Labs returned and are independently interpreted by myself to demonstrate:  -Electrolytes and renal function are within normal limits  -LFTs are within normal limits except for mildly elevated ALT, no tenderness to palpation in the right upper quadrant, low suspicion for biliary pathology given normal abdominal exam, further imaging not indicated  -Lipase is within normal limits [place] of pancreatitis  -No leukocytosis or anemia  -Labs are otherwise unremarkable    Upon reassessment patient is feeling improved.  Therefore he is reassured and advised on management of likely foodborne illness and viral gastroenteritis.  He is provided with prescriptions for Bentyl and Zofran and given strict return precautions.  Patient is then discharged in stable condition.    ADDITIONAL PROBLEM LIST AND RESOURCE UTILIZATION    Additional problems aside from the chief complaint that I have addressed: None    I have discussed management of the patient with the following physicians and STUART's: None    Discussion of management with other QHP or appropriate source(s): None    Escalation of care considered, and ultimately not performed: diagnostic imaging.     Barriers to care at this time, including but not limited to: None.     Decision tools and prescription drugs considered including, but not limited to: See above.    Please see review of records as noted above      FINAL IMPRESSION  1. Nausea and vomiting, unspecified vomiting type    2. Diarrhea, unspecified type

## 2023-06-22 NOTE — ED NOTES
Patient ambulated to [facility] [address] without incident. Primary assessment complete. Patient oriented to care area. Chart up for ERP.

## 2023-07-08 ENCOUNTER — HOSPITAL ENCOUNTER (EMERGENCY)
Facility: MEDICAL CENTER | Age: 23
End: 2023-07-09
Attending: EMERGENCY MEDICINE
Payer: MEDICAID

## 2023-07-08 ENCOUNTER — APPOINTMENT (OUTPATIENT)
Dept: RADIOLOGY | Facility: MEDICAL CENTER | Age: 23
End: 2023-07-08
Attending: EMERGENCY MEDICINE
Payer: MEDICAID

## 2023-07-08 DIAGNOSIS — R10.9 ABDOMINAL PAIN, UNSPECIFIED ABDOMINAL LOCATION: ICD-10-CM

## 2023-07-08 DIAGNOSIS — R19.7 DIARRHEA, UNSPECIFIED TYPE: ICD-10-CM

## 2023-07-08 DIAGNOSIS — R11.2 NAUSEA AND VOMITING, UNSPECIFIED VOMITING TYPE: ICD-10-CM

## 2023-07-08 LAB
ALBUMIN SERPL BCP-MCNC: 4.7 G/DL (ref 3.2–4.9)
ALBUMIN/GLOB SERPL: 2.4 G/DL
ALP SERPL-CCNC: 65 U/L (ref 30–99)
ALT SERPL-CCNC: 23 U/L (ref 2–50)
ANION GAP SERPL CALC-SCNC: 12 MMOL/L (ref 7–16)
APPEARANCE UR: CLEAR
AST SERPL-CCNC: 20 U/L (ref 12–45)
BASOPHILS # BLD AUTO: 0.4 % (ref 0–1.8)
BASOPHILS # BLD: 0.03 K/UL (ref 0–0.12)
BILIRUB SERPL-MCNC: 2 MG/DL (ref 0.1–1.5)
BILIRUB UR QL STRIP.AUTO: NEGATIVE
BUN SERPL-MCNC: 12 MG/DL (ref 8–22)
CALCIUM ALBUM COR SERPL-MCNC: 8.6 MG/DL (ref 8.5–10.5)
CALCIUM SERPL-MCNC: 9.2 MG/DL (ref 8.5–10.5)
CHLORIDE SERPL-SCNC: 101 MMOL/L (ref 96–112)
CO2 SERPL-SCNC: 25 MMOL/L (ref 20–33)
COLOR UR: YELLOW
CREAT SERPL-MCNC: 0.75 MG/DL (ref 0.5–1.4)
EOSINOPHIL # BLD AUTO: 0.13 K/UL (ref 0–0.51)
EOSINOPHIL NFR BLD: 1.9 % (ref 0–6.9)
ERYTHROCYTE [DISTWIDTH] IN BLOOD BY AUTOMATED COUNT: 39.1 FL (ref 35.9–50)
GFR SERPLBLD CREATININE-BSD FMLA CKD-EPI: 129 ML/MIN/1.73 M 2
GLOBULIN SER CALC-MCNC: 2 G/DL (ref 1.9–3.5)
GLUCOSE SERPL-MCNC: 123 MG/DL (ref 65–99)
GLUCOSE UR STRIP.AUTO-MCNC: NEGATIVE MG/DL
HCT VFR BLD AUTO: 40.7 % (ref 42–52)
HGB BLD-MCNC: 14 G/DL (ref 14–18)
IMM GRANULOCYTES # BLD AUTO: 0.02 K/UL (ref 0–0.11)
IMM GRANULOCYTES NFR BLD AUTO: 0.3 % (ref 0–0.9)
KETONES UR STRIP.AUTO-MCNC: ABNORMAL MG/DL
LEUKOCYTE ESTERASE UR QL STRIP.AUTO: NEGATIVE
LIPASE SERPL-CCNC: 29 U/L (ref 11–82)
LYMPHOCYTES # BLD AUTO: 2.35 K/UL (ref 1–4.8)
LYMPHOCYTES NFR BLD: 34.8 % (ref 22–41)
MCH RBC QN AUTO: 29.8 PG (ref 27–33)
MCHC RBC AUTO-ENTMCNC: 34.4 G/DL (ref 32.3–36.5)
MCV RBC AUTO: 86.6 FL (ref 81.4–97.8)
MICRO URNS: ABNORMAL
MONOCYTES # BLD AUTO: 0.49 K/UL (ref 0–0.85)
MONOCYTES NFR BLD AUTO: 7.2 % (ref 0–13.4)
NEUTROPHILS # BLD AUTO: 3.74 K/UL (ref 1.82–7.42)
NEUTROPHILS NFR BLD: 55.4 % (ref 44–72)
NITRITE UR QL STRIP.AUTO: NEGATIVE
NRBC # BLD AUTO: 0 K/UL
NRBC BLD-RTO: 0 /100 WBC (ref 0–0.2)
PH UR STRIP.AUTO: 6 [PH] (ref 5–8)
PLATELET # BLD AUTO: 253 K/UL (ref 164–446)
PMV BLD AUTO: 9.8 FL (ref 9–12.9)
POTASSIUM SERPL-SCNC: 3.7 MMOL/L (ref 3.6–5.5)
PROT SERPL-MCNC: 6.7 G/DL (ref 6–8.2)
PROT UR QL STRIP: NEGATIVE MG/DL
RBC # BLD AUTO: 4.7 M/UL (ref 4.7–6.1)
RBC UR QL AUTO: NEGATIVE
SODIUM SERPL-SCNC: 138 MMOL/L (ref 135–145)
SP GR UR STRIP.AUTO: 1.03
UROBILINOGEN UR STRIP.AUTO-MCNC: 1 MG/DL
WBC # BLD AUTO: 6.8 K/UL (ref 4.8–10.8)

## 2023-07-08 PROCEDURE — 36415 COLL VENOUS BLD VENIPUNCTURE: CPT

## 2023-07-08 PROCEDURE — 85025 COMPLETE CBC W/AUTO DIFF WBC: CPT

## 2023-07-08 PROCEDURE — 83690 ASSAY OF LIPASE: CPT

## 2023-07-08 PROCEDURE — 81003 URINALYSIS AUTO W/O SCOPE: CPT

## 2023-07-08 PROCEDURE — 80053 COMPREHEN METABOLIC PANEL: CPT

## 2023-07-08 PROCEDURE — 700111 HCHG RX REV CODE 636 W/ 250 OVERRIDE (IP): Mod: UD | Performed by: EMERGENCY MEDICINE

## 2023-07-08 PROCEDURE — 74022 RADEX COMPL AQT ABD SERIES: CPT

## 2023-07-08 PROCEDURE — 99284 EMERGENCY DEPT VISIT MOD MDM: CPT

## 2023-07-08 RX ORDER — ONDANSETRON 4 MG/1
4 TABLET, ORALLY DISINTEGRATING ORAL ONCE
Status: COMPLETED | OUTPATIENT
Start: 2023-07-08 | End: 2023-07-08

## 2023-07-08 RX ADMIN — ONDANSETRON 4 MG: 4 TABLET, ORALLY DISINTEGRATING ORAL at 23:40

## 2023-07-08 ASSESSMENT — FIBROSIS 4 INDEX: FIB4 SCORE: 0.41

## 2023-07-09 VITALS
OXYGEN SATURATION: 95 % | RESPIRATION RATE: 16 BRPM | HEIGHT: 69 IN | TEMPERATURE: 97.2 F | DIASTOLIC BLOOD PRESSURE: 71 MMHG | SYSTOLIC BLOOD PRESSURE: 121 MMHG | BODY MASS INDEX: 32.07 KG/M2 | HEART RATE: 77 BPM | WEIGHT: 216.49 LBS

## 2023-07-09 RX ORDER — ONDANSETRON 4 MG/1
4 TABLET, FILM COATED ORAL EVERY 6 HOURS PRN
Qty: 10 TABLET | Refills: 0 | Status: SHIPPED | OUTPATIENT
Start: 2023-07-09 | End: 2023-07-09 | Stop reason: SDUPTHER

## 2023-07-09 RX ORDER — ONDANSETRON 4 MG/1
4 TABLET, FILM COATED ORAL EVERY 6 HOURS PRN
Qty: 10 TABLET | Refills: 0 | Status: SHIPPED | OUTPATIENT
Start: 2023-07-09 | End: 2023-07-12

## 2023-07-09 NOTE — ED PROVIDER NOTES
ED Provider Note    CHIEF COMPLAINT  Chief Complaint   Patient presents with    Abdominal Pain     X 2 days, lower abdominal pain. +N/V/D.        EXTERNAL RECORDS REVIEWED  Other several previous emergency department visits here for nausea vomiting diarrhea as well as abdominal pain without any focal pathologic findings or evidence of intra-abdominal infection/pathology    HPI/ROS  LIMITATION TO HISTORY   Select: : None  OUTSIDE HISTORIAN(S):      Fidel Leger is a 23 y.o. male who presents to the emergency department with chief complaint of abdominal pain.  Patient reports that he thinks his mother told him that he had problems with his GI tract or his urinary system when he was younger and states he thinks he is losing nutrients in his urine.  Reports that he is been having several episodes of watery diarrhea over the last couple days and has had some crampy abdominal pain.  Denies fever he had profound nausea no emesis no blood in his diarrhea he states that his urine seems concentrated but no dysuria hematuria urinary hesitancy or urgency.  Pain is minimal diffuse throughout the abdomen without localization.  Denies any marijuana use however has been here several times before with similar complaints.    PAST MEDICAL HISTORY     None      SURGICAL HISTORY   has a past surgical history that includes tonsillectomy (2011).    FAMILY HISTORY  Family History   Problem Relation Age of Onset    Diabetes Mother     No Known Problems Father     Diabetes Sister     Diabetes Brother     No Known Problems Daughter     No Known Problems Daughter     No Known Problems Daughter     Cancer Neg Hx     Heart Disease Neg Hx        SOCIAL HISTORY  Social History     Tobacco Use    Smoking status: Never    Smokeless tobacco: Never   Vaping Use    Vaping Use: Never used   Substance and Sexual Activity    Alcohol use: Yes     Comment: occasionally; once every 4 months    Drug use: Never    Sexual activity: Not Currently  "    Partners: Female     Birth control/protection: Condom Male       CURRENT MEDICATIONS  Home Medications       Reviewed by Alee Dykes R.N. (Registered Nurse) on 07/08/23 at 2158  Med List Status: Not Addressed     Medication Last Dose Status   dicyclomine (BENTYL) 20 MG Tab  Active   ondansetron (ZOFRAN ODT) 4 MG TABLET DISPERSIBLE  Active   terbinafine (LAMISIL) 1 % cream  Active                    ALLERGIES  No Known Allergies    PHYSICAL EXAM  VITAL SIGNS: /82   Pulse 83   Temp 35.9 °C (96.7 °F) (Temporal)   Resp 19   Ht 1.753 m (5' 9\")   Wt 98.2 kg (216 lb 7.9 oz)   SpO2 97%   BMI 31.97 kg/m²    Pulse ox interpretation: I interpret this pulse ox as normal.  Constitutional: Alert and oriented x 3, minimal Distress  HEENT: Atraumatic normocephalic, pupils are equal round reactive to light extraocular movements are intact. The nares is clear, external ears are normal, mouth shows moist mucous membranes normal dentition for age  Neck: Supple, no JVD no tracheal deviation  Cardiovascular: Regular rate and rhythm no murmur rub or gallop 2+ pulses peripherally x4  Thorax & Lungs: No respiratory distress, no wheezes rales or rhonchi, No chest tenderness.   GI: Soft nontender nondistended positive bowel sounds, no peritoneal signs\  Skin: Warm dry no acute rash or lesion  Musculoskeletal: Moving all extremities with full range and 5 of 5 strength no acute  deformity  Neurologic: Cranial nerves III through XII are grossly intact no sensory deficit no cerebellar dysfunction   Psychiatric: Appropriate affect for situation at this time          DIAGNOSTIC STUDIES / PROCEDURES  Results for orders placed or performed during the hospital encounter of 07/08/23   CBC WITH DIFFERENTIAL   Result Value Ref Range    WBC 6.8 4.8 - 10.8 K/uL    RBC 4.70 4.70 - 6.10 M/uL    Hemoglobin 14.0 14.0 - 18.0 g/dL    Hematocrit 40.7 (L) 42.0 - 52.0 %    MCV 86.6 81.4 - 97.8 fL    MCH 29.8 27.0 - 33.0 pg    MCHC 34.4 32.3 - " 36.5 g/dL    RDW 39.1 35.9 - 50.0 fL    Platelet Count 253 164 - 446 K/uL    MPV 9.8 9.0 - 12.9 fL    Neutrophils-Polys 55.40 44.00 - 72.00 %    Lymphocytes 34.80 22.00 - 41.00 %    Monocytes 7.20 0.00 - 13.40 %    Eosinophils 1.90 0.00 - 6.90 %    Basophils 0.40 0.00 - 1.80 %    Immature Granulocytes 0.30 0.00 - 0.90 %    Nucleated RBC 0.00 0.00 - 0.20 /100 WBC    Neutrophils (Absolute) 3.74 1.82 - 7.42 K/uL    Lymphs (Absolute) 2.35 1.00 - 4.80 K/uL    Monos (Absolute) 0.49 0.00 - 0.85 K/uL    Eos (Absolute) 0.13 0.00 - 0.51 K/uL    Baso (Absolute) 0.03 0.00 - 0.12 K/uL    Immature Granulocytes (abs) 0.02 0.00 - 0.11 K/uL    NRBC (Absolute) 0.00 K/uL         RADIOLOGY      Radiologist interpretation:   DX-ABDOMEN COMPLETE WITH AP OR PA CXR   Final Result      1.  No acute cardiopulmonary abnormality.   2.  No dilated loops of bowel or free air.            COURSE & MEDICAL DECISION MAKING    ED Observation Status? Yes; I am placing the patient in to an observation status due to a diagnostic uncertainty as well as therapeutic intensity. Patient placed in observation status at 11:07 PM, 7/8/2023.     Observation plan is as follows: Labs and imaging, antiemetics and reevaluation    Upon Reevaluation, the patient's condition has: Improved; and will be discharged.    Patient discharged from ED Observation status at 01:32 (Time) 07/09/23   (Date).     ASSESSMENT, COURSE AND PLAN    Care Narrative: 23-year-old male here with some recurrent nausea vomiting abdominal pain.  X-rays are unremarkable vital signs are unremarkable his lab evaluations unremarkable.  He is given instructions on symptomatic management given instructions to return for any worsening abdominal pain, blood in stool, blood in emesis, pain with urination, back pain, fevers, chills, any other acute symptom change or concern      ADDITIONAL PROBLEM LIST    DISPOSITION AND DISCUSSIONS    I have discussed management of the patient with the following  "physicians and STUART's:      Discussion of management with other QHP or appropriate source(s): None     Escalation of care considered, and ultimately not performed:acute inpatient care management, however at this time, the patient is most appropriate for outpatient management    Barriers to care at this time, including but not limited to: .     Decision tools and prescription drugs considered including, but not limited to:  Antiemetics .  /71   Pulse 77   Temp 36.2 °C (97.2 °F) (Temporal)   Resp 16   Ht 1.753 m (5' 9\")   Wt 98.2 kg (216 lb 7.9 oz)   SpO2 95%   BMI 31.97 kg/m²     [facility] - [address] [address] [phone]  Schedule an appointment as soon as possible for a visit       [facility], Emergency Dept  [address] [facility] [address] [phone]    in 12-24 hours if symptoms persist, immediately If symptoms worsen, or if you develop any other symptoms or concerns      FINAL DIAGNOSIS  1. Abdominal pain, unspecified abdominal location Active       2. Nausea and vomiting, unspecified vomiting type Active ondansetron (ZOFRAN) 4 MG Tab tablet    DISCONTINUED: ondansetron (ZOFRAN) 4 MG Tab tablet      3. Diarrhea, unspecified type Active               "

## 2023-07-09 NOTE — ED TRIAGE NOTES
"Chief Complaint   Patient presents with    Abdominal Pain     X 2 days, lower abdominal pain. +N/V/D.        24 yo M to triage for above complaint. Protocol ordered.      Pt placed in lobby. Pt educated on triage process. Pt encouraged to alert staff for any changes.     Patient and staff wearing appropriate PPE    /82   Pulse 83   Temp 35.9 °C (96.7 °F) (Temporal)   Resp 19   Ht 1.753 m (5' 9\")   Wt 98.2 kg (216 lb 7.9 oz)   SpO2 97%   BMI 31.97 kg/m²     "

## 2024-09-16 ENCOUNTER — HOSPITAL ENCOUNTER (EMERGENCY)
Facility: MEDICAL CENTER | Age: 24
End: 2024-09-16
Attending: EMERGENCY MEDICINE
Payer: MEDICAID

## 2024-09-16 VITALS
OXYGEN SATURATION: 95 % | HEART RATE: 78 BPM | TEMPERATURE: 98.1 F | DIASTOLIC BLOOD PRESSURE: 63 MMHG | RESPIRATION RATE: 16 BRPM | BODY MASS INDEX: 34.78 KG/M2 | HEIGHT: 68 IN | WEIGHT: 229.5 LBS | SYSTOLIC BLOOD PRESSURE: 117 MMHG

## 2024-09-16 DIAGNOSIS — R11.2 NAUSEA AND VOMITING, UNSPECIFIED VOMITING TYPE: ICD-10-CM

## 2024-09-16 LAB
ALBUMIN SERPL BCP-MCNC: 4.6 G/DL (ref 3.2–4.9)
ALBUMIN/GLOB SERPL: 1.9 G/DL
ALP SERPL-CCNC: 69 U/L (ref 30–99)
ALT SERPL-CCNC: 51 U/L (ref 2–50)
ANION GAP SERPL CALC-SCNC: 13 MMOL/L (ref 7–16)
APPEARANCE UR: CLEAR
AST SERPL-CCNC: 38 U/L (ref 12–45)
BASOPHILS # BLD AUTO: 0.6 % (ref 0–1.8)
BASOPHILS # BLD: 0.04 K/UL (ref 0–0.12)
BILIRUB SERPL-MCNC: 1.2 MG/DL (ref 0.1–1.5)
BILIRUB UR QL STRIP.AUTO: NEGATIVE
BUN SERPL-MCNC: 10 MG/DL (ref 8–22)
CALCIUM ALBUM COR SERPL-MCNC: 8.8 MG/DL (ref 8.5–10.5)
CALCIUM SERPL-MCNC: 9.3 MG/DL (ref 8.5–10.5)
CHLORIDE SERPL-SCNC: 104 MMOL/L (ref 96–112)
CO2 SERPL-SCNC: 22 MMOL/L (ref 20–33)
COLOR UR: YELLOW
CREAT SERPL-MCNC: 0.84 MG/DL (ref 0.5–1.4)
EOSINOPHIL # BLD AUTO: 0.1 K/UL (ref 0–0.51)
EOSINOPHIL NFR BLD: 1.6 % (ref 0–6.9)
ERYTHROCYTE [DISTWIDTH] IN BLOOD BY AUTOMATED COUNT: 38.3 FL (ref 35.9–50)
GFR SERPLBLD CREATININE-BSD FMLA CKD-EPI: 124 ML/MIN/1.73 M 2
GLOBULIN SER CALC-MCNC: 2.4 G/DL (ref 1.9–3.5)
GLUCOSE SERPL-MCNC: 114 MG/DL (ref 65–99)
GLUCOSE UR STRIP.AUTO-MCNC: NEGATIVE MG/DL
HCT VFR BLD AUTO: 48 % (ref 42–52)
HGB BLD-MCNC: 16.5 G/DL (ref 14–18)
IMM GRANULOCYTES # BLD AUTO: 0.03 K/UL (ref 0–0.11)
IMM GRANULOCYTES NFR BLD AUTO: 0.5 % (ref 0–0.9)
KETONES UR STRIP.AUTO-MCNC: NEGATIVE MG/DL
LEUKOCYTE ESTERASE UR QL STRIP.AUTO: NEGATIVE
LIPASE SERPL-CCNC: 33 U/L (ref 11–82)
LYMPHOCYTES # BLD AUTO: 1.47 K/UL (ref 1–4.8)
LYMPHOCYTES NFR BLD: 23 % (ref 22–41)
MCH RBC QN AUTO: 29.5 PG (ref 27–33)
MCHC RBC AUTO-ENTMCNC: 34.4 G/DL (ref 32.3–36.5)
MCV RBC AUTO: 85.7 FL (ref 81.4–97.8)
MICRO URNS: NORMAL
MONOCYTES # BLD AUTO: 0.5 K/UL (ref 0–0.85)
MONOCYTES NFR BLD AUTO: 7.8 % (ref 0–13.4)
NEUTROPHILS # BLD AUTO: 4.24 K/UL (ref 1.82–7.42)
NEUTROPHILS NFR BLD: 66.5 % (ref 44–72)
NITRITE UR QL STRIP.AUTO: NEGATIVE
NRBC # BLD AUTO: 0 K/UL
NRBC BLD-RTO: 0 /100 WBC (ref 0–0.2)
PH UR STRIP.AUTO: 5.5 [PH] (ref 5–8)
PLATELET # BLD AUTO: 302 K/UL (ref 164–446)
PMV BLD AUTO: 9.7 FL (ref 9–12.9)
POTASSIUM SERPL-SCNC: 4.2 MMOL/L (ref 3.6–5.5)
PROT SERPL-MCNC: 7 G/DL (ref 6–8.2)
PROT UR QL STRIP: NEGATIVE MG/DL
RBC # BLD AUTO: 5.6 M/UL (ref 4.7–6.1)
RBC UR QL AUTO: NEGATIVE
SODIUM SERPL-SCNC: 139 MMOL/L (ref 135–145)
SP GR UR STRIP.AUTO: 1.02
UROBILINOGEN UR STRIP.AUTO-MCNC: 0.2 MG/DL
WBC # BLD AUTO: 6.4 K/UL (ref 4.8–10.8)

## 2024-09-16 PROCEDURE — 81003 URINALYSIS AUTO W/O SCOPE: CPT

## 2024-09-16 PROCEDURE — 36415 COLL VENOUS BLD VENIPUNCTURE: CPT

## 2024-09-16 PROCEDURE — 99284 EMERGENCY DEPT VISIT MOD MDM: CPT

## 2024-09-16 PROCEDURE — 80053 COMPREHEN METABOLIC PANEL: CPT

## 2024-09-16 PROCEDURE — 700111 HCHG RX REV CODE 636 W/ 250 OVERRIDE (IP): Mod: JZ,UD | Performed by: EMERGENCY MEDICINE

## 2024-09-16 PROCEDURE — 96374 THER/PROPH/DIAG INJ IV PUSH: CPT

## 2024-09-16 PROCEDURE — 83690 ASSAY OF LIPASE: CPT

## 2024-09-16 PROCEDURE — 85025 COMPLETE CBC W/AUTO DIFF WBC: CPT

## 2024-09-16 RX ORDER — ONDANSETRON 4 MG/1
4 TABLET, ORALLY DISINTEGRATING ORAL EVERY 8 HOURS PRN
Qty: 10 TABLET | Refills: 0 | Status: SHIPPED | OUTPATIENT
Start: 2024-09-16

## 2024-09-16 RX ORDER — ONDANSETRON 2 MG/ML
4 INJECTION INTRAMUSCULAR; INTRAVENOUS ONCE
Status: COMPLETED | OUTPATIENT
Start: 2024-09-16 | End: 2024-09-16

## 2024-09-16 RX ADMIN — ONDANSETRON 4 MG: 2 INJECTION INTRAMUSCULAR; INTRAVENOUS at 07:07

## 2024-09-16 ASSESSMENT — FIBROSIS 4 INDEX: FIB4 SCORE: 0.41

## 2024-09-16 NOTE — Clinical Note
Fidel Leger was seen and treated in our emergency department on 9/16/2024.  He may return to work on 09/18/2024.       If you have any questions or concerns, please don't hesitate to call.      Jesse Chavez M.D.

## 2024-09-16 NOTE — ED PROVIDER NOTES
"ED PHYSICIAN NOTE    CHIEF COMPLAINT  Chief Complaint   Patient presents with    N/V     Patient reports n/v/d x 3 days.        EXTERNAL RECORDS REVIEWED  Patient has had prior ED encounters for abdominal pain    HPI/ROS    Fidel Leger is a 24 y.o. male who presents with nausea vomiting diarrhea.  Patient started getting rumbling in his stomach about 7 days ago.  About 4 days ago developed diarrhea.  A couple episodes of watery nonbloody stool per day.  Over the last 3 days he has had nausea with vomiting associated with this diarrhea.  Having a couple episodes of vomiting.  His daughter has been sick with vomiting and diarrhea as well.  He has not had a fever.  He has abdominal discomfort across upper abdomen.  Cramping character.  No known abnormal foods.  No travel out of the country.  No recent antibiotics.    PAST MEDICAL HISTORY  History reviewed. No pertinent past medical history.    SOCIAL HISTORY  Social History     Tobacco Use    Smoking status: Never    Smokeless tobacco: Never   Vaping Use    Vaping status: Never Used   Substance Use Topics    Alcohol use: Yes     Comment: occasionally; once every 4 months    Drug use: Never       CURRENT MEDICATIONS  Home Medications       Reviewed by Dawood Mcnair R.N. (Registered Nurse) on 09/16/24 at 0633  Med List Status: Not Addressed     Medication Last Dose Status   dicyclomine (BENTYL) 20 MG Tab  Active   ondansetron (ZOFRAN ODT) 4 MG TABLET DISPERSIBLE  Active   terbinafine (LAMISIL) 1 % cream  Active                    ALLERGIES  No Known Allergies    PHYSICAL EXAM  VITAL SIGNS: /63   Pulse 78   Temp 36.7 °C (98.1 °F) (Temporal)   Resp 16   Ht 1.727 m (5' 8\")   Wt 104 kg (229 lb 8 oz)   SpO2 95%   BMI 34.90 kg/m²    Constitutional: Awake and alert  HENT: Moist mucous membranes  Eyes: Normal inspection  Neck: Grossly normal range of motion.  Cardiovascular: Normal heart rate, Normal rhythm.  Symmetric peripheral pulses.   Thorax & " Lungs: No respiratory distress, No wheezing, No rales, No rhonchi, No chest tenderness.   Abdomen: Bowel sounds normal, soft, non-distended, minimal epigastric abdominal tenderness no rebound or peritonitis  Skin: No obvious rash.  Back: No tenderness, No CVA tenderness.   Extremities: No clubbing, cyanosis, edema, no Homans or cords.  Neurologic: Grossly normal   Psychiatric: Normal for situation     DIAGNOSTIC STUDIES / PROCEDURES  LABS/EKG  Results for orders placed or performed during the hospital encounter of 09/16/24   CBC WITH DIFFERENTIAL   Result Value Ref Range    WBC 6.4 4.8 - 10.8 K/uL    RBC 5.60 4.70 - 6.10 M/uL    Hemoglobin 16.5 14.0 - 18.0 g/dL    Hematocrit 48.0 42.0 - 52.0 %    MCV 85.7 81.4 - 97.8 fL    MCH 29.5 27.0 - 33.0 pg    MCHC 34.4 32.3 - 36.5 g/dL    RDW 38.3 35.9 - 50.0 fL    Platelet Count 302 164 - 446 K/uL    MPV 9.7 9.0 - 12.9 fL    Neutrophils-Polys 66.50 44.00 - 72.00 %    Lymphocytes 23.00 22.00 - 41.00 %    Monocytes 7.80 0.00 - 13.40 %    Eosinophils 1.60 0.00 - 6.90 %    Basophils 0.60 0.00 - 1.80 %    Immature Granulocytes 0.50 0.00 - 0.90 %    Nucleated RBC 0.00 0.00 - 0.20 /100 WBC    Neutrophils (Absolute) 4.24 1.82 - 7.42 K/uL    Lymphs (Absolute) 1.47 1.00 - 4.80 K/uL    Monos (Absolute) 0.50 0.00 - 0.85 K/uL    Eos (Absolute) 0.10 0.00 - 0.51 K/uL    Baso (Absolute) 0.04 0.00 - 0.12 K/uL    Immature Granulocytes (abs) 0.03 0.00 - 0.11 K/uL    NRBC (Absolute) 0.00 K/uL   COMP METABOLIC PANEL   Result Value Ref Range    Sodium 139 135 - 145 mmol/L    Potassium 4.2 3.6 - 5.5 mmol/L    Chloride 104 96 - 112 mmol/L    Co2 22 20 - 33 mmol/L    Anion Gap 13.0 7.0 - 16.0    Glucose 114 (H) 65 - 99 mg/dL    Bun 10 8 - 22 mg/dL    Creatinine 0.84 0.50 - 1.40 mg/dL    Calcium 9.3 8.5 - 10.5 mg/dL    Correct Calcium 8.8 8.5 - 10.5 mg/dL    AST(SGOT) 38 12 - 45 U/L    ALT(SGPT) 51 (H) 2 - 50 U/L    Alkaline Phosphatase 69 30 - 99 U/L    Total Bilirubin 1.2 0.1 - 1.5 mg/dL    Albumin  4.6 3.2 - 4.9 g/dL    Total Protein 7.0 6.0 - 8.2 g/dL    Globulin 2.4 1.9 - 3.5 g/dL    A-G Ratio 1.9 g/dL   LIPASE   Result Value Ref Range    Lipase 33 11 - 82 U/L   URINALYSIS    Specimen: Urine   Result Value Ref Range    Color Yellow     Character Clear     Specific Gravity 1.017 <1.035    Ph 5.5 5.0 - 8.0    Glucose Negative Negative mg/dL    Ketones Negative Negative mg/dL    Protein Negative Negative mg/dL    Bilirubin Negative Negative    Urobilinogen, Urine 0.2 Negative    Nitrite Negative Negative    Leukocyte Esterase Negative Negative    Occult Blood Negative Negative    Micro Urine Req see below    ESTIMATED GFR   Result Value Ref Range    GFR (CKD-EPI) 124 >60 mL/min/1.73 m 2         COURSE & MEDICAL DECISION MAKING    INITIAL ASSESSMENT, COURSE AND PLAN  Care Narrative: Patient presents with nausea vomiting diarrhea.  Sick child at home.  Benign exam.  Describes rather prolonged symptoms and therefore laboratory data was ordered and collected.  Patient was treated with Zofran.  The patient does not appear dehydrated does not require IV fluids.    Laboratory data returned reassuring.  Repeat examination was benign.  There is no suggestion of appendicitis, obstruction, cholecystitis pancreatitis or other acute abdominal process.  No indication for emergency imaging.  I suspect most likely this is a viral illness.  Management is symptomatic.  I have given a prescription for Zofran.  Advised over-the-counter Pepto-Bismol or Imodium for diarrhea.  He should return to ER for fever, abdominal pain, worsening or concern.  Follow-up with primary doctor in 1 week if not well.      Interventions  Medications   ondansetron (Zofran) syringe/vial injection 4 mg (4 mg Intravenous Given 9/16/24 0707)       DISPOSITION AND DISCUSSIONS    Escalation of care considered, and ultimately not performed:Laboratory analysis and acute inpatient care management, however at this time, the patient is most appropriate for  outpatient management      Follow up  Sarai Levin M.D.  [address] [address]  [phone]          FINAL IMPRESSION  1.  Vomiting and diarrhea, suspected viral illness    This dictation was created using voice recognition software. The accuracy of the dictation is limited to the abilities of the software. I expect there may be some errors of grammar and possibly content. The nursing notes were reviewed and certain aspects of this information were incorporated into this note.    Electronically signed by: Jesse Chavez M.D., 9/16/2024

## 2024-09-16 NOTE — ED NOTES
Pt ambulated to bathroom with a steady gait, urine collected and sent to lab. Pt updated on current plan of care. States nausea improved after medication.

## 2024-09-16 NOTE — ED NOTES
Pt cleared for discharge, VSS, pt in nad.   PIV removed, catheter intact, dressing applied.   Pt discharged home with written and verbal instructions regarding f/u, activity, reasons to return to ED & RX to  at preferred pharmacy. Verbalized understanding, all questions addressed, ambulated out with a steady gait with all belongings.

## 2024-09-16 NOTE — ED TRIAGE NOTES
"Chief Complaint   Patient presents with    N/V     Patient reports n/v/d x 3 days.        Pt is alert and oriented, speaking in full sentences, follows commands and responds appropriately to questions. Resperations are even and unlabored.      Pt placed in lobby. Pt educated on triage process. Pt encouraged to alert staff for any changes.     Patient and staff wearing appropriate PPE.    /73   Pulse 80   Temp 36.6 °C (97.9 °F) (Temporal)   Resp 16   Ht 1.727 m (5' 8\")   Wt 104 kg (229 lb 8 oz)   SpO2 97%    "

## 2024-11-26 ENCOUNTER — HOSPITAL ENCOUNTER (EMERGENCY)
Facility: MEDICAL CENTER | Age: 24
End: 2024-11-26
Attending: STUDENT IN AN ORGANIZED HEALTH CARE EDUCATION/TRAINING PROGRAM
Payer: MEDICAID

## 2024-11-26 VITALS
HEART RATE: 94 BPM | DIASTOLIC BLOOD PRESSURE: 74 MMHG | HEIGHT: 69 IN | BODY MASS INDEX: 35.4 KG/M2 | WEIGHT: 238.98 LBS | OXYGEN SATURATION: 95 % | SYSTOLIC BLOOD PRESSURE: 141 MMHG | TEMPERATURE: 98 F | RESPIRATION RATE: 16 BRPM

## 2024-11-26 DIAGNOSIS — M79.10 MYALGIA: ICD-10-CM

## 2024-11-26 DIAGNOSIS — I10 PRIMARY HYPERTENSION: ICD-10-CM

## 2024-11-26 DIAGNOSIS — R50.9 SUBJECTIVE FEVER: ICD-10-CM

## 2024-11-26 DIAGNOSIS — M54.50 ACUTE RIGHT-SIDED LOW BACK PAIN WITHOUT SCIATICA: ICD-10-CM

## 2024-11-26 LAB
ALBUMIN SERPL BCP-MCNC: 4.9 G/DL (ref 3.2–4.9)
ALBUMIN/GLOB SERPL: 1.6 G/DL
ALP SERPL-CCNC: 84 U/L (ref 30–99)
ALT SERPL-CCNC: 120 U/L (ref 2–50)
ANION GAP SERPL CALC-SCNC: 11 MMOL/L (ref 7–16)
APPEARANCE UR: CLEAR
AST SERPL-CCNC: 89 U/L (ref 12–45)
BASOPHILS # BLD AUTO: 0.4 % (ref 0–1.8)
BASOPHILS # BLD: 0.02 K/UL (ref 0–0.12)
BILIRUB SERPL-MCNC: 1.2 MG/DL (ref 0.1–1.5)
BILIRUB UR QL STRIP.AUTO: NEGATIVE
BUN SERPL-MCNC: 8 MG/DL (ref 8–22)
CALCIUM ALBUM COR SERPL-MCNC: 9.1 MG/DL (ref 8.5–10.5)
CALCIUM SERPL-MCNC: 9.8 MG/DL (ref 8.5–10.5)
CHLORIDE SERPL-SCNC: 103 MMOL/L (ref 96–112)
CO2 SERPL-SCNC: 25 MMOL/L (ref 20–33)
COLOR UR: YELLOW
CREAT SERPL-MCNC: 0.84 MG/DL (ref 0.5–1.4)
EOSINOPHIL # BLD AUTO: 0.04 K/UL (ref 0–0.51)
EOSINOPHIL NFR BLD: 0.9 % (ref 0–6.9)
ERYTHROCYTE [DISTWIDTH] IN BLOOD BY AUTOMATED COUNT: 36.3 FL (ref 35.9–50)
FLUAV RNA SPEC QL NAA+PROBE: NEGATIVE
FLUBV RNA SPEC QL NAA+PROBE: NEGATIVE
GFR SERPLBLD CREATININE-BSD FMLA CKD-EPI: 123 ML/MIN/1.73 M 2
GLOBULIN SER CALC-MCNC: 3 G/DL (ref 1.9–3.5)
GLUCOSE SERPL-MCNC: 93 MG/DL (ref 65–99)
GLUCOSE UR STRIP.AUTO-MCNC: NEGATIVE MG/DL
HCT VFR BLD AUTO: 49.1 % (ref 42–52)
HGB BLD-MCNC: 16.8 G/DL (ref 14–18)
IMM GRANULOCYTES # BLD AUTO: 0.01 K/UL (ref 0–0.11)
IMM GRANULOCYTES NFR BLD AUTO: 0.2 % (ref 0–0.9)
KETONES UR STRIP.AUTO-MCNC: NEGATIVE MG/DL
LEUKOCYTE ESTERASE UR QL STRIP.AUTO: NEGATIVE
LYMPHOCYTES # BLD AUTO: 0.85 K/UL (ref 1–4.8)
LYMPHOCYTES NFR BLD: 18.8 % (ref 22–41)
MCH RBC QN AUTO: 29.2 PG (ref 27–33)
MCHC RBC AUTO-ENTMCNC: 34.2 G/DL (ref 32.3–36.5)
MCV RBC AUTO: 85.2 FL (ref 81.4–97.8)
MICRO URNS: NORMAL
MONOCYTES # BLD AUTO: 0.35 K/UL (ref 0–0.85)
MONOCYTES NFR BLD AUTO: 7.7 % (ref 0–13.4)
NEUTROPHILS # BLD AUTO: 3.25 K/UL (ref 1.82–7.42)
NEUTROPHILS NFR BLD: 72 % (ref 44–72)
NITRITE UR QL STRIP.AUTO: NEGATIVE
NRBC # BLD AUTO: 0 K/UL
NRBC BLD-RTO: 0 /100 WBC (ref 0–0.2)
PH UR STRIP.AUTO: 6 [PH] (ref 5–8)
PLATELET # BLD AUTO: 281 K/UL (ref 164–446)
PMV BLD AUTO: 9.5 FL (ref 9–12.9)
POTASSIUM SERPL-SCNC: 4 MMOL/L (ref 3.6–5.5)
PROT SERPL-MCNC: 7.9 G/DL (ref 6–8.2)
PROT UR QL STRIP: NEGATIVE MG/DL
RBC # BLD AUTO: 5.76 M/UL (ref 4.7–6.1)
RBC UR QL AUTO: NEGATIVE
RSV RNA SPEC QL NAA+PROBE: NEGATIVE
SARS-COV-2 RNA RESP QL NAA+PROBE: NOTDETECTED
SODIUM SERPL-SCNC: 139 MMOL/L (ref 135–145)
SP GR UR STRIP.AUTO: 1.02
SPECIMEN SOURCE: NORMAL
UROBILINOGEN UR STRIP.AUTO-MCNC: 1 EU/DL
WBC # BLD AUTO: 4.5 K/UL (ref 4.8–10.8)

## 2024-11-26 PROCEDURE — A9270 NON-COVERED ITEM OR SERVICE: HCPCS | Mod: UD | Performed by: STUDENT IN AN ORGANIZED HEALTH CARE EDUCATION/TRAINING PROGRAM

## 2024-11-26 PROCEDURE — 36415 COLL VENOUS BLD VENIPUNCTURE: CPT

## 2024-11-26 PROCEDURE — 81003 URINALYSIS AUTO W/O SCOPE: CPT

## 2024-11-26 PROCEDURE — 700102 HCHG RX REV CODE 250 W/ 637 OVERRIDE(OP): Mod: UD | Performed by: STUDENT IN AN ORGANIZED HEALTH CARE EDUCATION/TRAINING PROGRAM

## 2024-11-26 PROCEDURE — 85025 COMPLETE CBC W/AUTO DIFF WBC: CPT

## 2024-11-26 PROCEDURE — 80053 COMPREHEN METABOLIC PANEL: CPT

## 2024-11-26 PROCEDURE — 0241U HCHG SARS-COV-2 COVID-19 NFCT DS RESP RNA 4 TRGT MIC: CPT

## 2024-11-26 PROCEDURE — 700101 HCHG RX REV CODE 250: Mod: UD | Performed by: STUDENT IN AN ORGANIZED HEALTH CARE EDUCATION/TRAINING PROGRAM

## 2024-11-26 PROCEDURE — 99284 EMERGENCY DEPT VISIT MOD MDM: CPT

## 2024-11-26 RX ORDER — CYCLOBENZAPRINE HCL 10 MG
10 TABLET ORAL ONCE
Status: COMPLETED | OUTPATIENT
Start: 2024-11-26 | End: 2024-11-26

## 2024-11-26 RX ORDER — LIDOCAINE 4 G/G
1 PATCH TOPICAL EVERY 24 HOURS
Status: DISCONTINUED | OUTPATIENT
Start: 2024-11-26 | End: 2024-11-26 | Stop reason: HOSPADM

## 2024-11-26 RX ORDER — IBUPROFEN 600 MG/1
600 TABLET, FILM COATED ORAL EVERY 6 HOURS PRN
Status: DISCONTINUED | OUTPATIENT
Start: 2024-11-26 | End: 2024-11-26 | Stop reason: HOSPADM

## 2024-11-26 RX ORDER — CYCLOBENZAPRINE HCL 10 MG
10 TABLET ORAL 3 TIMES DAILY PRN
Qty: 30 TABLET | Refills: 0 | Status: SHIPPED | OUTPATIENT
Start: 2024-11-26

## 2024-11-26 RX ORDER — LIDOCAINE 4 G/G
1 PATCH TOPICAL EVERY 24 HOURS
Qty: 10 PATCH | Refills: 0 | Status: SHIPPED | OUTPATIENT
Start: 2024-11-26

## 2024-11-26 RX ADMIN — LIDOCAINE 1 PATCH: 4 PATCH TOPICAL at 12:26

## 2024-11-26 RX ADMIN — IBUPROFEN 600 MG: 600 TABLET, FILM COATED ORAL at 12:25

## 2024-11-26 RX ADMIN — CYCLOBENZAPRINE 10 MG: 10 TABLET, FILM COATED ORAL at 12:25

## 2024-11-26 ASSESSMENT — FIBROSIS 4 INDEX: FIB4 SCORE: 0.44

## 2024-11-26 NOTE — ED PROVIDER NOTES
ED Provider Note    CHIEF COMPLAINT  Chief Complaint   Patient presents with    Back Pain     Since Friday, starts in low back and radiates up to neck. Also reports hot flashes and headaches. Denies any trauma.        EXTERNAL RECORDS REVIEWED  External ED Note ER visit 9/2024 for nausea vomiting and discharged from the ER    HPI/ROS  LIMITATION TO HISTORY   Select: : None  OUTSIDE HISTORIAN(S):  None    Fidel Leger is a 24 y.o. male who presents with back pain subjective fevers and fatigue.  Patient recalls no work-related injuries causing his back pain.  He notes the back pain seems to radiate up into his neck.  Has small children but does not recall injury while lifting at home either.  Did find relief with ibuprofen given by his wife.  He does not have a cough runny nose vomiting diarrhea or other viral type symptoms.  No paresthesias of the lower extremities.  No saddle anesthesia, no bowel or bladder incontinence, no IV drug use history..  No urinary retention.    PAST MEDICAL HISTORY       SURGICAL HISTORY   has a past surgical history that includes tonsillectomy (2011).    FAMILY HISTORY  Family History   Problem Relation Age of Onset    Diabetes Mother     No Known Problems Father     Diabetes Sister     Diabetes Brother     No Known Problems Daughter     No Known Problems Daughter     No Known Problems Daughter     Cancer Neg Hx     Heart Disease Neg Hx        SOCIAL HISTORY  Social History     Tobacco Use    Smoking status: Never    Smokeless tobacco: Never   Vaping Use    Vaping status: Never Used   Substance and Sexual Activity    Alcohol use: Yes     Comment: occasionally; once every 4 months    Drug use: Never    Sexual activity: Not Currently     Partners: Female     Birth control/protection: Condom Male       CURRENT MEDICATIONS  Home Medications       Reviewed by Margo Bhandari R.N. (Registered Nurse) on 11/26/24 at 1121  Med List Status: Partial     Medication Last Dose Status  "  dicyclomine (BENTYL) 20 MG Tab  Active   ondansetron (ZOFRAN ODT) 4 MG TABLET DISPERSIBLE  Active   terbinafine (LAMISIL) 1 % cream  Active                    ALLERGIES  No Known Allergies    PHYSICAL EXAM  VITAL SIGNS: BP (!) 141/74   Pulse 94   Temp 36.7 °C (98 °F) (Temporal)   Resp 16   Ht 1.753 m (5' 9\")   Wt 108 kg (238 lb 15.7 oz)   SpO2 95%   BMI 35.29 kg/m²    Pulse oximetry interpretation: I interpret the pulse oximetry as normal.  Constitutional: Awake and alert. No acute distress.  Head: NCAT.  HEENT: Normal Conjunctiva.  Neck: Grossly normal range of motion. Airway midline.  No midline neck tenderness  Cardiovascular: Normal heart rate, Normal rhythm.  Thorax & Lungs: No respiratory distress.  Abdomen: Normal inspection. Nontender. Nondistended  Skin: No obvious rash.  Back: No midline tenderness, No CVA tenderness.   Musculoskeletal: No obvious deformity. Moves all extremities Well.  Neurologic: A&Ox4.  Ambulatory without assistance.  Grossly normal lower extremity strength on exam.  Psychiatric: Mood and affect are appropriate for situation.    EKG/LABS  Results for orders placed or performed during the hospital encounter of 11/26/24   CBC WITH DIFFERENTIAL    Collection Time: 11/26/24 12:15 PM   Result Value Ref Range    WBC 4.5 (L) 4.8 - 10.8 K/uL    RBC 5.76 4.70 - 6.10 M/uL    Hemoglobin 16.8 14.0 - 18.0 g/dL    Hematocrit 49.1 42.0 - 52.0 %    MCV 85.2 81.4 - 97.8 fL    MCH 29.2 27.0 - 33.0 pg    MCHC 34.2 32.3 - 36.5 g/dL    RDW 36.3 35.9 - 50.0 fL    Platelet Count 281 164 - 446 K/uL    MPV 9.5 9.0 - 12.9 fL    Neutrophils-Polys 72.00 44.00 - 72.00 %    Lymphocytes 18.80 (L) 22.00 - 41.00 %    Monocytes 7.70 0.00 - 13.40 %    Eosinophils 0.90 0.00 - 6.90 %    Basophils 0.40 0.00 - 1.80 %    Immature Granulocytes 0.20 0.00 - 0.90 %    Nucleated RBC 0.00 0.00 - 0.20 /100 WBC    Neutrophils (Absolute) 3.25 1.82 - 7.42 K/uL    Lymphs (Absolute) 0.85 (L) 1.00 - 4.80 K/uL    Monos (Absolute) " 0.35 0.00 - 0.85 K/uL    Eos (Absolute) 0.04 0.00 - 0.51 K/uL    Baso (Absolute) 0.02 0.00 - 0.12 K/uL    Immature Granulocytes (abs) 0.01 0.00 - 0.11 K/uL    NRBC (Absolute) 0.00 K/uL   COMP METABOLIC PANEL    Collection Time: 11/26/24 12:15 PM   Result Value Ref Range    Sodium 139 135 - 145 mmol/L    Potassium 4.0 3.6 - 5.5 mmol/L    Chloride 103 96 - 112 mmol/L    Co2 25 20 - 33 mmol/L    Anion Gap 11.0 7.0 - 16.0    Glucose 93 65 - 99 mg/dL    Bun 8 8 - 22 mg/dL    Creatinine 0.84 0.50 - 1.40 mg/dL    Calcium 9.8 8.5 - 10.5 mg/dL    Correct Calcium 9.1 8.5 - 10.5 mg/dL    AST(SGOT) 89 (H) 12 - 45 U/L    ALT(SGPT) 120 (H) 2 - 50 U/L    Alkaline Phosphatase 84 30 - 99 U/L    Total Bilirubin 1.2 0.1 - 1.5 mg/dL    Albumin 4.9 3.2 - 4.9 g/dL    Total Protein 7.9 6.0 - 8.2 g/dL    Globulin 3.0 1.9 - 3.5 g/dL    A-G Ratio 1.6 g/dL   CoV-2, Flu A/B, And RSV by PCR ([employer])    Collection Time: 11/26/24 12:15 PM    Specimen: Respirate   Result Value Ref Range    Influenza virus A RNA Negative Negative    Influenza virus B, PCR Negative Negative    RSV, PCR Negative Negative    SARS-CoV-2 by PCR NotDetected     SARS-CoV-2 Source NP Swab    ESTIMATED GFR    Collection Time: 11/26/24 12:15 PM   Result Value Ref Range    GFR (CKD-EPI) 123 >60 mL/min/1.73 m 2   URINALYSIS,CULTURE IF INDICATED    Collection Time: 11/26/24 12:35 PM    Specimen: Urine, Clean Catch   Result Value Ref Range    Color Yellow     Character Clear     Specific Gravity 1.020 <1.035    Ph 6.0 5.0 - 8.0    Glucose Negative Negative mg/dL    Ketones Negative Negative mg/dL    Protein Negative Negative mg/dL    Bilirubin Negative Negative    Urobilinogen, Urine 1.0 <=1.0 EU/dL    Nitrite Negative Negative    Leukocyte Esterase Negative Negative    Occult Blood Negative Negative    Micro Urine Req see below      COURSE & MEDICAL DECISION MAKING    ASSESSMENT, COURSE AND PLAN  Care Narrative:   24-year-old male otherwise healthy here with subjective fevers  and back pain without acute cause of back pain  Afebrile and hypertensive  On exam no midline tenderness of the spine, ambulatory, strength grossly normal.  Patient has no red flag signs or symptoms of back pain on history or exam.  However given his subjective fevers and no presenting event causing his pain we will obtain blood and viral swab to screen for possible more insidious process.  Consideration to cauda equina, epidural abscess, myalgias from viral illness, acute back strain, disc herniation  He is medicated for pain with Flexeril lidocaine patch and anti-inflammatories  Labs are largely reassuring with no acute derangements.  Viral swab and urine are negative.  Discussed supportive care at home for likely viral myalgias versus back strain.  Will discharge with muscle relaxants and lidocaine patch.  Advised to continue Tylenol ibuprofen as they have been helpful at home.      Z01.30: Encounter for examination of blood pressure without abnormal findings:   I spent a total of 5 minutes counseling patient on blood pressure control and management.  We discussed medication management with PCP.  I recommended keeping a blood pressure journal, taking the blood pressure once in the morning once evening after resting for 5 minutes. Advise follow up with PCP to discuss medication management or adjustment.  Discussed hypertensive emergencies and what signs and symptoms to be monitoring for and to return to the ED for.  Patient verbalized understanding and was receptive of counseling.    ADDITIONAL PROBLEMS MANAGED    hypertension    DISPOSITION AND DISCUSSIONS  I have discussed management of the patient with the following physicians and STUART's:  none    Discussion of management with other QHP or appropriate source(s): None     Escalation of care considered, and ultimately not performed:Laboratory analysis and acute inpatient care management, however at this time, the patient is most appropriate for outpatient  management    Barriers to care at this time, including but not limited to:  None .     Decision tools and prescription drugs considered including, but not limited to: Pain Medications for myalgias, back pain .    FINAL DIAGNOSIS  1. Acute right-sided low back pain without sciatica    2. Subjective fever    3. Myalgia    4. Primary hypertension         Electronically signed by: Yelitza Rose D.O., 11/26/2024 12:06 PM

## 2024-11-26 NOTE — ED NOTES
Pt discharged to home. Pt was given follow up instructions and prescriptions for flexeril and lidocaine . Pt verbalized understanding of all instructions for discharge and is ambulatory out of ED with steady gait. AOx4

## 2024-11-26 NOTE — ED TRIAGE NOTES
"Chief Complaint   Patient presents with    Back Pain     Since Friday, starts in low back and radiates up to neck. Also reports hot flashes and headaches. Denies any trauma.        Pt ambulatory to triage. Pt A&Ox4, room air. Pt moves all extremities.      Pt to lobby . Pt educated on alerting staff in changes to condition. Pt verbalized understanding.     BP (!) 151/90   Pulse (!) 103   Temp 37.1 °C (98.7 °F) (Temporal)   Resp 18   Ht 1.753 m (5' 9\")   Wt 108 kg (238 lb 15.7 oz)   SpO2 95%   BMI 35.29 kg/m²     "

## 2024-11-27 ENCOUNTER — APPOINTMENT (OUTPATIENT)
Dept: RADIOLOGY | Facility: MEDICAL CENTER | Age: 24
End: 2024-11-27
Attending: EMERGENCY MEDICINE
Payer: MEDICAID

## 2024-11-27 ENCOUNTER — HOSPITAL ENCOUNTER (EMERGENCY)
Facility: MEDICAL CENTER | Age: 24
End: 2024-11-27
Attending: EMERGENCY MEDICINE
Payer: MEDICAID

## 2024-11-27 ENCOUNTER — PHARMACY VISIT (OUTPATIENT)
Dept: PHARMACY | Facility: MEDICAL CENTER | Age: 24
End: 2024-11-27
Payer: COMMERCIAL

## 2024-11-27 VITALS
TEMPERATURE: 98.2 F | RESPIRATION RATE: 16 BRPM | HEART RATE: 96 BPM | HEIGHT: 69 IN | OXYGEN SATURATION: 96 % | SYSTOLIC BLOOD PRESSURE: 118 MMHG | BODY MASS INDEX: 34.29 KG/M2 | DIASTOLIC BLOOD PRESSURE: 76 MMHG | WEIGHT: 231.48 LBS

## 2024-11-27 DIAGNOSIS — R19.7 DIARRHEA, UNSPECIFIED TYPE: ICD-10-CM

## 2024-11-27 DIAGNOSIS — R10.30 LOWER ABDOMINAL PAIN: ICD-10-CM

## 2024-11-27 DIAGNOSIS — R74.01 TRANSAMINITIS: ICD-10-CM

## 2024-11-27 LAB
ALBUMIN SERPL BCP-MCNC: 5.2 G/DL (ref 3.2–4.9)
ALBUMIN/GLOB SERPL: 1.5 G/DL
ALP SERPL-CCNC: 89 U/L (ref 30–99)
ALT SERPL-CCNC: 155 U/L (ref 2–50)
ANION GAP SERPL CALC-SCNC: 13 MMOL/L (ref 7–16)
APPEARANCE UR: CLEAR
AST SERPL-CCNC: 89 U/L (ref 12–45)
BASOPHILS # BLD AUTO: 0.5 % (ref 0–1.8)
BASOPHILS # BLD: 0.02 K/UL (ref 0–0.12)
BILIRUB SERPL-MCNC: 1.4 MG/DL (ref 0.1–1.5)
BILIRUB UR QL STRIP.AUTO: NEGATIVE
BUN SERPL-MCNC: 9 MG/DL (ref 8–22)
CALCIUM ALBUM COR SERPL-MCNC: 9.2 MG/DL (ref 8.5–10.5)
CALCIUM SERPL-MCNC: 10.2 MG/DL (ref 8.5–10.5)
CHLORIDE SERPL-SCNC: 101 MMOL/L (ref 96–112)
CO2 SERPL-SCNC: 26 MMOL/L (ref 20–33)
COLOR UR: YELLOW
CREAT SERPL-MCNC: 0.9 MG/DL (ref 0.5–1.4)
EOSINOPHIL # BLD AUTO: 0.04 K/UL (ref 0–0.51)
EOSINOPHIL NFR BLD: 1 % (ref 0–6.9)
ERYTHROCYTE [DISTWIDTH] IN BLOOD BY AUTOMATED COUNT: 37.6 FL (ref 35.9–50)
GFR SERPLBLD CREATININE-BSD FMLA CKD-EPI: 121 ML/MIN/1.73 M 2
GLOBULIN SER CALC-MCNC: 3.5 G/DL (ref 1.9–3.5)
GLUCOSE SERPL-MCNC: 99 MG/DL (ref 65–99)
GLUCOSE UR STRIP.AUTO-MCNC: NEGATIVE MG/DL
HCT VFR BLD AUTO: 54.5 % (ref 42–52)
HGB BLD-MCNC: 18.4 G/DL (ref 14–18)
IMM GRANULOCYTES # BLD AUTO: 0.01 K/UL (ref 0–0.11)
IMM GRANULOCYTES NFR BLD AUTO: 0.2 % (ref 0–0.9)
KETONES UR STRIP.AUTO-MCNC: NEGATIVE MG/DL
LEUKOCYTE ESTERASE UR QL STRIP.AUTO: NEGATIVE
LIPASE SERPL-CCNC: 18 U/L (ref 11–82)
LYMPHOCYTES # BLD AUTO: 0.99 K/UL (ref 1–4.8)
LYMPHOCYTES NFR BLD: 24 % (ref 22–41)
MCH RBC QN AUTO: 29 PG (ref 27–33)
MCHC RBC AUTO-ENTMCNC: 33.8 G/DL (ref 32.3–36.5)
MCV RBC AUTO: 85.8 FL (ref 81.4–97.8)
MICRO URNS: NORMAL
MONOCYTES # BLD AUTO: 0.32 K/UL (ref 0–0.85)
MONOCYTES NFR BLD AUTO: 7.7 % (ref 0–13.4)
NEUTROPHILS # BLD AUTO: 2.75 K/UL (ref 1.82–7.42)
NEUTROPHILS NFR BLD: 66.6 % (ref 44–72)
NITRITE UR QL STRIP.AUTO: NEGATIVE
NRBC # BLD AUTO: 0 K/UL
NRBC BLD-RTO: 0 /100 WBC (ref 0–0.2)
PH UR STRIP.AUTO: 6 [PH] (ref 5–8)
PLATELET # BLD AUTO: 311 K/UL (ref 164–446)
PMV BLD AUTO: 9.4 FL (ref 9–12.9)
POTASSIUM SERPL-SCNC: 4.1 MMOL/L (ref 3.6–5.5)
PROT SERPL-MCNC: 8.7 G/DL (ref 6–8.2)
PROT UR QL STRIP: NEGATIVE MG/DL
RBC # BLD AUTO: 6.35 M/UL (ref 4.7–6.1)
RBC UR QL AUTO: NEGATIVE
SODIUM SERPL-SCNC: 140 MMOL/L (ref 135–145)
SP GR UR STRIP.AUTO: 1.02
UROBILINOGEN UR STRIP.AUTO-MCNC: 0.2 EU/DL
WBC # BLD AUTO: 4.1 K/UL (ref 4.8–10.8)

## 2024-11-27 PROCEDURE — 99284 EMERGENCY DEPT VISIT MOD MDM: CPT

## 2024-11-27 PROCEDURE — 85025 COMPLETE CBC W/AUTO DIFF WBC: CPT

## 2024-11-27 PROCEDURE — 74177 CT ABD & PELVIS W/CONTRAST: CPT

## 2024-11-27 PROCEDURE — 80053 COMPREHEN METABOLIC PANEL: CPT

## 2024-11-27 PROCEDURE — 83690 ASSAY OF LIPASE: CPT

## 2024-11-27 PROCEDURE — 36415 COLL VENOUS BLD VENIPUNCTURE: CPT

## 2024-11-27 PROCEDURE — 96374 THER/PROPH/DIAG INJ IV PUSH: CPT | Mod: XU

## 2024-11-27 PROCEDURE — 700117 HCHG RX CONTRAST REV CODE 255: Mod: UD | Performed by: EMERGENCY MEDICINE

## 2024-11-27 PROCEDURE — RXMED WILLOW AMBULATORY MEDICATION CHARGE: Performed by: EMERGENCY MEDICINE

## 2024-11-27 PROCEDURE — 96375 TX/PRO/DX INJ NEW DRUG ADDON: CPT

## 2024-11-27 PROCEDURE — 81003 URINALYSIS AUTO W/O SCOPE: CPT

## 2024-11-27 PROCEDURE — 700111 HCHG RX REV CODE 636 W/ 250 OVERRIDE (IP): Mod: JZ,UD | Performed by: EMERGENCY MEDICINE

## 2024-11-27 RX ORDER — KETOROLAC TROMETHAMINE 15 MG/ML
15 INJECTION, SOLUTION INTRAMUSCULAR; INTRAVENOUS ONCE
Status: COMPLETED | OUTPATIENT
Start: 2024-11-27 | End: 2024-11-27

## 2024-11-27 RX ORDER — ONDANSETRON 4 MG/1
4 TABLET, ORALLY DISINTEGRATING ORAL EVERY 6 HOURS PRN
Qty: 10 TABLET | Refills: 0 | Status: SHIPPED | OUTPATIENT
Start: 2024-11-27

## 2024-11-27 RX ORDER — ONDANSETRON 2 MG/ML
4 INJECTION INTRAMUSCULAR; INTRAVENOUS ONCE
Status: COMPLETED | OUTPATIENT
Start: 2024-11-27 | End: 2024-11-27

## 2024-11-27 RX ADMIN — KETOROLAC TROMETHAMINE 15 MG: 15 INJECTION, SOLUTION INTRAMUSCULAR; INTRAVENOUS at 09:05

## 2024-11-27 RX ADMIN — ONDANSETRON 4 MG: 2 INJECTION INTRAMUSCULAR; INTRAVENOUS at 09:05

## 2024-11-27 RX ADMIN — IOHEXOL 100 ML: 350 INJECTION, SOLUTION INTRAVENOUS at 08:59

## 2024-11-27 ASSESSMENT — PAIN DESCRIPTION - PAIN TYPE
TYPE: ACUTE PAIN

## 2024-11-27 ASSESSMENT — FIBROSIS 4 INDEX: FIB4 SCORE: 0.72

## 2024-11-27 NOTE — ED TRIAGE NOTES
Chief Complaint   Patient presents with    Abdominal Pain     6/10 LRQ pain since last night after ER visit for alternate complaint. Reports pain increases when having bm.     Diarrhea     Since last night after discharge     Pt ambulatory to triage for above complaint.      Pt is alert/oriented and follows commands. Pt speaking in full sentences and responds appropriately to questions. No acute distress noted in triage and respirations are even and unlabored.     Pt placed in hallway for blood draw and educated on triage process. Pt encouraged to alert staff for any changes in condition.

## 2024-11-27 NOTE — ED NOTES
PIV removed. Pt discharged to home. Pt provided education and discharge instructions per ERP. Pt ambulatory out of ED with steady gait.

## 2024-11-27 NOTE — ED PROVIDER NOTES
ED Provider Note    CHIEF COMPLAINT  Chief Complaint   Patient presents with    Abdominal Pain     6/10 LRQ pain since last night after ER visit for alternate complaint. Reports pain increases when having bm.     Diarrhea     Since last night after discharge     EXTERNAL RECORDS REVIEWED  Review of records reveal that the patient was here last night for back pain and subjective fever. He had normal labs, urine test and negative viral swab. He was discharged with symptomatic treatment for musculoskeletal pain.     HPI/ROS  LIMITATION TO HISTORY   Select: : None  OUTSIDE HISTORIAN(S):  None    AntonioOliver Leger is a 24 y.o. male who presents to the Emergency Department who presents with a 4-day history of progressive symptoms, starting with fever, heat flashes, and lower back pain.  Since being discharged last night, the patient reports that he now has diarrhea in addition to lower right quadrant abdominal pain. He reports experiencing shortness of breath, which is aggravated by the lower back pain. This is also the first instance of back pain the patient has experienced, and he reports no history of back injury. The patient denies vomiting, cough, congestion, or sore throat. He also denies any urinary symptoms. The patient has been managing his pain with Motrin at home.  He is also reporting some irritation around the rectum due to diarrhea.    PAST MEDICAL HISTORY  History reviewed. No pertinent past medical history.     SURGICAL HISTORY  Past Surgical History:   Procedure Laterality Date    TONSILLECTOMY  2011        FAMILY HISTORY  Family History   Problem Relation Age of Onset    Diabetes Mother     No Known Problems Father     Diabetes Sister     Diabetes Brother     No Known Problems Daughter     No Known Problems Daughter     No Known Problems Daughter     Cancer Neg Hx     Heart Disease Neg Hx        SOCIAL HISTORY   reports that he has never smoked. He has never used smokeless tobacco. He reports  "current alcohol use. He reports that he does not use drugs.    CURRENT MEDICATIONS  Discharge Medication List as of 11/27/2024 10:07 AM        CONTINUE these medications which have NOT CHANGED    Details   cyclobenzaprine (FLEXERIL) 10 mg Tab Take 1 Tablet by mouth 3 times a day as needed for Moderate Pain., Disp-30 Tablet, R-0, Normal      lidocaine (ASPERFLEX) 4 % Patch Place 1 Patch on the skin every 24 hours., Disp-10 Patch, R-0, Normal      !! ondansetron (ZOFRAN ODT) 4 MG TABLET DISPERSIBLE Take 1 Tablet by mouth every 8 hours as needed for Nausea/Vomiting., Disp-10 Tablet, R-0, Normal      dicyclomine (BENTYL) 20 MG Tab Take 1 Tablet by mouth every 6 hours as needed (diarrhea/abdominal cramping)., Disp-60 Tablet, R-0, Normal      terbinafine (LAMISIL) 1 % cream Apply 1 Application topically 2 times a day., Disp-15 g, R-0, Normal       !! - Potential duplicate medications found. Please discuss with provider.          ALLERGIES  Patient has no known allergies.    PHYSICAL EXAM  BP (!) 141/73   Pulse 72   Temp 36.5 °C (97.7 °F) (Temporal)   Resp 16   Ht 1.753 m (5' 9\")   Wt 105 kg (231 lb 7.7 oz)   SpO2 94%      Constitutional: Nontoxic appearing. Alert in no apparent distress.  HENT: Normocephalic, Atraumatic. Bilateral external ears normal. Nose normal.  Moist mucous membranes.  Oropharynx clear.  Eyes: Pupils are equal and reactive. Conjunctiva normal.   Neck: Supple, full range of motion  Heart: Regular rate and rhythm.  No murmurs.    Lungs: No respiratory distress, normal work of breathing. Lungs clear to auscultation bilaterally.  Abdomen sSoft, no distention.  Mild right lower quadrant tenderness.  No rebound or guarding.  Musculoskeletal: Atraumatic. No obvious deformities noted.  No lower extremity edema.  Skin: Warm, Dry.  No erythema, No rash.   Rectal: Mild area of skin ulceration/abrasion at the 6 O clock position around rectum. No signs of bleeding or hemorrhoids.    Neurologic: Alert and " oriented x3. Moving all extremities spontaneously without focal deficits.  Psychiatric: Affect normal, Mood normal, Appears appropriate and not intoxicated.    DIAGNOSTIC STUDIES / PROCEDURES    LABS  Labs Reviewed   CBC WITH DIFFERENTIAL - Abnormal; Notable for the following components:       Result Value    WBC 4.1 (*)     RBC 6.35 (*)     Hemoglobin 18.4 (*)     Hematocrit 54.5 (*)     Lymphs (Absolute) 0.99 (*)     All other components within normal limits   COMP METABOLIC PANEL - Abnormal; Notable for the following components:    AST(SGOT) 89 (*)     ALT(SGPT) 155 (*)     Albumin 5.2 (*)     Total Protein 8.7 (*)     All other components within normal limits   LIPASE   URINALYSIS   ESTIMATED GFR     RADIOLOGY  I have independently interpreted the diagnostic imaging associated with this visit and am waiting the final reading from the radiologist.   My preliminary interpretation is as follows: Normal appendix    Radiologist interpretation:  CT-ABDOMEN-PELVIS WITH   Final Result      1.  No acute inflammation in the abdomen or pelvis. Normal appendix..   2.  Hepatomegaly.          COURSE & MEDICAL DECISION MAKING    7:47 AM - Patient seen and examined at bedside. Discussed plan of care, including labs and imaging. Patient agrees to the plan of care. The patient will be medicated with Toradol and Zofran. Ordered for CT abdomen pelvis, CBC w/ differential, ,CMP, lipase, UA to evaluate his symptoms.       ASSESSMENT, COURSE AND PLAN  Care Narrative: Patient seen here yesterday for musculoskeletal back pain who is now presenting with abdominal pain and diarrhea.  He is overall well-appearing with normal vital signs on arrival and no fever.  His labs demonstrate mild neutropenia however no other findings concerning for sepsis.  No renal dysfunction or electrolyte abnormality.  Urinalysis is negative for infection.  He has evidence of mild transaminitis that appears slightly chronic, no elevated lipase concerning for  pancreatitis.  He does not have any focal tenderness over the right upper quadrant to suggest cholecystitis.  Imaging was performed as he does have tenderness over the right lower quadrant and is normal without signs of appendicitis, diverticulitis, colitis.  He does have a small skin abrasion around the rectum however no evidence of surrounding cellulitis or abscess.  Diarrhea may be due to viral or foodborne illness.  Do not feel it was connected with the Flexeril or ibuprofen he was prescribed from yesterday.    9:44 AM - Upon reassessment, patient is resting comfortably. No new complaints at this time.  Discussed results with patient and/or family as well as importance of primary care follow up.  Patient understands plan of care and strict return precautions for new or changing symptoms. Patient verbalizes understanding and agreement to this plan of care.    ADDITIONAL PROBLEM LIST  Problem #1: Acute lower abdominal pain -workup reassuring, discharged with supportive care    Problem #2: Diarrhea -instructed on over-the-counter Imodium    Problem #3: Transaminitis -likely related to fatty liver disease, discussed continue outpatient monitoring with primary provider    DISPOSITION AND DISCUSSIONS    Decision tools and prescription drugs considered including, but not limited to: Pain Medications OTC medication should be sufficient .    The patient will return for new or worsening symptoms and is stable at the time of discharge.    DISPOSITION:  Patient will be discharged home in stable condition.    FOLLOW UP:  Sarai Levin M.D.  [address] [place] [id number]  [phone]    Schedule an appointment as soon as possible for a visit       [facility], Emergency Dept  [address] [place] [id number] [phone]    If symptoms worsen      OUTPATIENT MEDICATIONS:  Discharge Medication List as of 11/27/2024 10:07 AM        START taking these medications    Details   !!  ondansetron (ZOFRAN ODT) 4 MG TABLET DISPERSIBLE Take 1 Tablet by mouth every 6 hours as needed for Nausea/Vomiting., Disp-10 Tablet, R-0, Normal       !! - Potential duplicate medications found. Please discuss with provider.          FINAL DIAGNOSIS  1. Lower abdominal pain    2. Diarrhea, unspecified type    3. Transaminitis        The note accurately reflects work and decisions made by me.  Alexa Castelan M.D.  11/27/2024  3:08 PM     INicky (Scribe), am scribing for, and in the presence of, Alexa Castelan M.D..    Electronically signed by: Nicky Cronin ([email or web address]), 11/27/2024    Alexa AHN M.D. personally performed the services described in this documentation, as scribed by Nicky Cronin in my presence, and it is both accurate and complete.

## 2024-11-27 NOTE — ED NOTES
Patient identity verified in [facility]. Pt ambulated to rm yellow 64 from the [facility] with a steady gait, changed into gown, monitors on. This RN agrees with triage note. Chart up for ERP.

## 2024-11-27 NOTE — DISCHARGE INSTRUCTIONS
You were seen in the Emergency Department for diarrhea, abdominal pain.    Labs, imaging were completed without significant acute abnormalities other than mild elevation of liver function test which is likely related to fatty liver disease.    Please use 1,000mg of tylenol or 600mg of ibuprofen every 6 hours as needed for pain.    You can use over-the-counter Imodium as needed for diarrhea.    Please follow up with your primary care physician.    Return to the Emergency Department with fevers, uncontrolled pain or vomiting, or other concerns.

## 2024-11-27 NOTE — Clinical Note
Fidel Leger was seen and treated in our emergency department on 11/27/2024.  He may return to work on 11/29/2024.       If you have any questions or concerns, please don't hesitate to call.      Alexa Castelan M.D.

## 2025-03-03 ENCOUNTER — HOSPITAL ENCOUNTER (EMERGENCY)
Facility: MEDICAL CENTER | Age: 25
End: 2025-03-03
Attending: EMERGENCY MEDICINE
Payer: MEDICAID

## 2025-03-03 VITALS
HEIGHT: 69 IN | OXYGEN SATURATION: 94 % | HEART RATE: 83 BPM | RESPIRATION RATE: 16 BRPM | BODY MASS INDEX: 34.25 KG/M2 | WEIGHT: 231.26 LBS | SYSTOLIC BLOOD PRESSURE: 132 MMHG | TEMPERATURE: 98.8 F | DIASTOLIC BLOOD PRESSURE: 80 MMHG

## 2025-03-03 DIAGNOSIS — R10.9 ABDOMINAL PAIN, UNSPECIFIED ABDOMINAL LOCATION: ICD-10-CM

## 2025-03-03 DIAGNOSIS — R19.7 DIARRHEA, UNSPECIFIED TYPE: ICD-10-CM

## 2025-03-03 LAB
ALBUMIN SERPL BCP-MCNC: 4.7 G/DL (ref 3.2–4.9)
ALBUMIN/GLOB SERPL: 1.7 G/DL
ALP SERPL-CCNC: 68 U/L (ref 30–99)
ALT SERPL-CCNC: 35 U/L (ref 2–50)
ANION GAP SERPL CALC-SCNC: 11 MMOL/L (ref 7–16)
AST SERPL-CCNC: 28 U/L (ref 12–45)
BASOPHILS # BLD AUTO: 0.7 % (ref 0–1.8)
BASOPHILS # BLD: 0.04 K/UL (ref 0–0.12)
BILIRUB SERPL-MCNC: 1.3 MG/DL (ref 0.1–1.5)
BUN SERPL-MCNC: 8 MG/DL (ref 8–22)
CALCIUM ALBUM COR SERPL-MCNC: 9.1 MG/DL (ref 8.5–10.5)
CALCIUM SERPL-MCNC: 9.7 MG/DL (ref 8.5–10.5)
CHLORIDE SERPL-SCNC: 106 MMOL/L (ref 96–112)
CO2 SERPL-SCNC: 23 MMOL/L (ref 20–33)
CREAT SERPL-MCNC: 0.85 MG/DL (ref 0.5–1.4)
EOSINOPHIL # BLD AUTO: 0.1 K/UL (ref 0–0.51)
EOSINOPHIL NFR BLD: 1.9 % (ref 0–6.9)
ERYTHROCYTE [DISTWIDTH] IN BLOOD BY AUTOMATED COUNT: 38.7 FL (ref 35.9–50)
GFR SERPLBLD CREATININE-BSD FMLA CKD-EPI: 123 ML/MIN/1.73 M 2
GLOBULIN SER CALC-MCNC: 2.7 G/DL (ref 1.9–3.5)
GLUCOSE SERPL-MCNC: 112 MG/DL (ref 65–99)
HCT VFR BLD AUTO: 48.9 % (ref 42–52)
HGB BLD-MCNC: 16.9 G/DL (ref 14–18)
IMM GRANULOCYTES # BLD AUTO: 0.01 K/UL (ref 0–0.11)
IMM GRANULOCYTES NFR BLD AUTO: 0.2 % (ref 0–0.9)
LIPASE SERPL-CCNC: 26 U/L (ref 11–82)
LYMPHOCYTES # BLD AUTO: 1.34 K/UL (ref 1–4.8)
LYMPHOCYTES NFR BLD: 25 % (ref 22–41)
MCH RBC QN AUTO: 29.7 PG (ref 27–33)
MCHC RBC AUTO-ENTMCNC: 34.6 G/DL (ref 32.3–36.5)
MCV RBC AUTO: 85.9 FL (ref 81.4–97.8)
MONOCYTES # BLD AUTO: 0.31 K/UL (ref 0–0.85)
MONOCYTES NFR BLD AUTO: 5.8 % (ref 0–13.4)
NEUTROPHILS # BLD AUTO: 3.56 K/UL (ref 1.82–7.42)
NEUTROPHILS NFR BLD: 66.4 % (ref 44–72)
NRBC # BLD AUTO: 0 K/UL
NRBC BLD-RTO: 0 /100 WBC (ref 0–0.2)
PLATELET # BLD AUTO: 302 K/UL (ref 164–446)
PMV BLD AUTO: 9.5 FL (ref 9–12.9)
POTASSIUM SERPL-SCNC: 4.3 MMOL/L (ref 3.6–5.5)
PROT SERPL-MCNC: 7.4 G/DL (ref 6–8.2)
RBC # BLD AUTO: 5.69 M/UL (ref 4.7–6.1)
SODIUM SERPL-SCNC: 140 MMOL/L (ref 135–145)
WBC # BLD AUTO: 5.4 K/UL (ref 4.8–10.8)

## 2025-03-03 PROCEDURE — 83690 ASSAY OF LIPASE: CPT

## 2025-03-03 PROCEDURE — 85025 COMPLETE CBC W/AUTO DIFF WBC: CPT

## 2025-03-03 PROCEDURE — 80053 COMPREHEN METABOLIC PANEL: CPT

## 2025-03-03 PROCEDURE — 99284 EMERGENCY DEPT VISIT MOD MDM: CPT

## 2025-03-03 PROCEDURE — 36415 COLL VENOUS BLD VENIPUNCTURE: CPT

## 2025-03-03 RX ORDER — DICYCLOMINE HYDROCHLORIDE 10 MG/1
10 CAPSULE ORAL
Qty: 120 CAPSULE | Refills: 0 | Status: SHIPPED | OUTPATIENT
Start: 2025-03-03

## 2025-03-03 ASSESSMENT — FIBROSIS 4 INDEX: FIB4 SCORE: 0.6

## 2025-03-03 NOTE — ED TRIAGE NOTES
Antonio Arunakoko Leger  25 y.o. male  Chief Complaint   Patient presents with    Diarrhea     X3 days diarrhea with abdominal pain and gas.      Pt ambulated to triage. Abdominal pain protocol ordered.     Pt is alert, oriented, and follows commands. Pt speaking in full sentences and responds appropriately to questions. No acute distress noted in triage. Respirations are even and unlabored.     Pt to lobby and educated on triage process. Pt encouraged to alert triage staff for any changes in condition. Pt signed up for messaging updates prior to leaving triage room.    Vitals:    03/03/25 0621   BP: (!) 142/84   Pulse: 69   Resp: 15   Temp: 36.5 °C (97.7 °F)   SpO2: 97%

## 2025-03-03 NOTE — ED NOTES
Bedside report received from off going RN Ana Paula, assumed care of patient.  POC discussed with patient. Call light within reach, all needs addressed at this time.       Fall risk interventions in place: Not Applicable (all applicable per [facility] Fall risk assessment)   Continuous monitoring: Pulse Ox or Blood Pressure  IVF/IV medications: Not Applicable   Oxygen: Room Air  Bedside sitter: Not Applicable   Isolation: Not Applicable

## 2025-03-03 NOTE — Clinical Note
Fidel Leger was seen and treated in our emergency department on 3/3/2025.  He may return to work on 03/04/2025.       If you have any questions or concerns, please don't hesitate to call.      Sunil Wang M.D.

## 2025-03-03 NOTE — ED PROVIDER NOTES
ED Provider Note    CHIEF COMPLAINT  Chief Complaint   Patient presents with    Diarrhea     X3 days diarrhea with abdominal pain and gas.        EXTERNAL RECORDS REVIEWED  Inpatient Notes ER notes 11/26/2024 and 11/27/2024 reviewed    HPI/ROS  LIMITATION TO HISTORY   Select: : None  OUTSIDE HISTORIAN(S):  None    Fidel Leger is a 25 y.o. male who presents to the emergency department with GI upset.  States that he had been here in the emergency department previously a few months ago for the same.  At that time he was told to to adjust diet which he has followed.  States that when he avoids greasy/fatty foods he feels significant better.  A few days ago he had a meal from [employer] and this had seemingly triggered this episode of abdominal cramping and diarrhea.  Due to the persistence and recurrence historically he decided come to the ER today.    Most notably, he explains that he works outside driving a forklift and bumping around is somewhat uncomfortable given the fact that he is afraid of stooling himself.    PAST MEDICAL HISTORY       SURGICAL HISTORY   has a past surgical history that includes tonsillectomy (2011).    FAMILY HISTORY  Family History   Problem Relation Age of Onset    Diabetes Mother     No Known Problems Father     Diabetes Sister     Diabetes Brother     No Known Problems Daughter     No Known Problems Daughter     No Known Problems Daughter     Cancer Neg Hx     Heart Disease Neg Hx        SOCIAL HISTORY  Social History     Tobacco Use    Smoking status: Never    Smokeless tobacco: Never   Vaping Use    Vaping status: Some Days   Substance and Sexual Activity    Alcohol use: Yes     Comment: occasionally; once every 4 months    Drug use: Never    Sexual activity: Not Currently     Partners: Female     Birth control/protection: Condom Male       CURRENT MEDICATIONS  Home Medications       Reviewed by Lindsey Kruger R.N. (Registered Nurse) on 03/03/25 at 0686  Med List  "Status: Partial     Medication Last Dose Status   cyclobenzaprine (FLEXERIL) 10 mg Tab  Active   dicyclomine (BENTYL) 20 MG Tab  Active   lidocaine (ASPERFLEX) 4 % Patch  Active   ondansetron (ZOFRAN ODT) 4 MG TABLET DISPERSIBLE  Active   ondansetron (ZOFRAN ODT) 4 MG TABLET DISPERSIBLE  Active   terbinafine (LAMISIL) 1 % cream  Active                    ALLERGIES  No Known Allergies    PHYSICAL EXAM  VITAL SIGNS: /80   Pulse 83   Temp 37.1 °C (98.8 °F) (Temporal)   Resp 16   Ht 1.753 m (5' 9\")   Wt 105 kg (231 lb 4.2 oz)   SpO2 94%   BMI 34.15 kg/m²          Pulse ox interpretation: I interpret this pulse ox as normal.  Constitutional: Alert in no apparent distress.  HENT: No signs of trauma, Bilateral external ears normal, Nose normal.   Eyes: Pupils are equal and reactive  Neck: Normal range of motion, No tenderness, Supple  Cardiovascular: Regular rate and rhythm, no murmurs.   Thorax & Lungs: Normal breath sounds, No respiratory distress  Abdomen: Bowel sounds normal, Soft, No tenderness  Skin: Warm, Dry, No erythema, No rash.   Musculoskeletal: Good range of motion in all major joints. No tenderness to palpation or major deformities noted.   Neurologic: Alert , Normal motor function, Normal sensory function, No focal deficits noted.   Psychiatric: Affect normal, Judgment normal, Mood normal.         EKG/LABS  Results for orders placed or performed during the hospital encounter of 03/03/25   CBC WITH DIFFERENTIAL    Collection Time: 03/03/25  6:55 AM   Result Value Ref Range    WBC 5.4 4.8 - 10.8 K/uL    RBC 5.69 4.70 - 6.10 M/uL    Hemoglobin 16.9 14.0 - 18.0 g/dL    Hematocrit 48.9 42.0 - 52.0 %    MCV 85.9 81.4 - 97.8 fL    MCH 29.7 27.0 - 33.0 pg    MCHC 34.6 32.3 - 36.5 g/dL    RDW 38.7 35.9 - 50.0 fL    Platelet Count 302 164 - 446 K/uL    MPV 9.5 9.0 - 12.9 fL    Neutrophils-Polys 66.40 44.00 - 72.00 %    Lymphocytes 25.00 22.00 - 41.00 %    Monocytes 5.80 0.00 - 13.40 %    Eosinophils 1.90 " 0.00 - 6.90 %    Basophils 0.70 0.00 - 1.80 %    Immature Granulocytes 0.20 0.00 - 0.90 %    Nucleated RBC 0.00 0.00 - 0.20 /100 WBC    Neutrophils (Absolute) 3.56 1.82 - 7.42 K/uL    Lymphs (Absolute) 1.34 1.00 - 4.80 K/uL    Monos (Absolute) 0.31 0.00 - 0.85 K/uL    Eos (Absolute) 0.10 0.00 - 0.51 K/uL    Baso (Absolute) 0.04 0.00 - 0.12 K/uL    Immature Granulocytes (abs) 0.01 0.00 - 0.11 K/uL    NRBC (Absolute) 0.00 K/uL   COMP METABOLIC PANEL    Collection Time: 03/03/25  6:55 AM   Result Value Ref Range    Sodium 140 135 - 145 mmol/L    Potassium 4.3 3.6 - 5.5 mmol/L    Chloride 106 96 - 112 mmol/L    Co2 23 20 - 33 mmol/L    Anion Gap 11.0 7.0 - 16.0    Glucose 112 (H) 65 - 99 mg/dL    Bun 8 8 - 22 mg/dL    Creatinine 0.85 0.50 - 1.40 mg/dL    Calcium 9.7 8.5 - 10.5 mg/dL    Correct Calcium 9.1 8.5 - 10.5 mg/dL    AST(SGOT) 28 12 - 45 U/L    ALT(SGPT) 35 2 - 50 U/L    Alkaline Phosphatase 68 30 - 99 U/L    Total Bilirubin 1.3 0.1 - 1.5 mg/dL    Albumin 4.7 3.2 - 4.9 g/dL    Total Protein 7.4 6.0 - 8.2 g/dL    Globulin 2.7 1.9 - 3.5 g/dL    A-G Ratio 1.7 g/dL   LIPASE    Collection Time: 03/03/25  6:55 AM   Result Value Ref Range    Lipase 26 11 - 82 U/L   ESTIMATED GFR    Collection Time: 03/03/25  6:55 AM   Result Value Ref Range    GFR (CKD-EPI) 123 >60 mL/min/1.73 m 2         RADIOLOGY/PROCEDURES   Deferred      COURSE & MEDICAL DECISION MAKING    ASSESSMENT, COURSE AND PLAN  Care Narrative: Patient presents with recurrent diarrhea after greasy meal.  Will evaluate labs and then decide on any further emergent workup as needed.    DISPOSITION AND DISCUSSIONS  I have discussed management of the patient with the following physicians and STUART's: None    Discussion of management with other QHP or appropriate source(s): None     Escalation of care considered, and ultimately not performed:diagnostic imaging and acute inpatient care management, however at this time, the patient is most appropriate for outpatient  management    Barriers to care at this time, including but not limited to:  None .     25-year-old male presented to the emerged from with above presentation.  Labs are reassuring.  Clinical exam is also reassuring.  At this point I do believe the patient likely has recurrent GI upset secondary to diet change as described above.  At this point we will refer back to PCP as well as GI contacts being provided.  He will be represcribed Bentyl for any abdominal cramping as he did have relief with this previously.  He is also understanding of continuation of prior diet as discussed.  Also understanding of ability return here with any change or worsening in the meantime.        FINAL DIAGNOSIS  1. Diarrhea, unspecified type    2. Abdominal pain, unspecified abdominal location         Electronically signed by: Sunil Wang M.D., 3/3/2025 7:09 AM

## 2025-03-03 NOTE — ED NOTES
Pt has discharge orders. Pt educated on discharge instructions and new prescriptions.  Pt verbalizes understanding and agrees with care plan.  PIV removed. Pt going home to self.

## 2025-04-07 ENCOUNTER — HOSPITAL ENCOUNTER (EMERGENCY)
Facility: MEDICAL CENTER | Age: 25
End: 2025-04-07
Attending: STUDENT IN AN ORGANIZED HEALTH CARE EDUCATION/TRAINING PROGRAM
Payer: MEDICAID

## 2025-04-07 ENCOUNTER — APPOINTMENT (OUTPATIENT)
Dept: RADIOLOGY | Facility: MEDICAL CENTER | Age: 25
End: 2025-04-07
Attending: STUDENT IN AN ORGANIZED HEALTH CARE EDUCATION/TRAINING PROGRAM
Payer: MEDICAID

## 2025-04-07 VITALS
BODY MASS INDEX: 35.2 KG/M2 | WEIGHT: 237.66 LBS | RESPIRATION RATE: 16 BRPM | SYSTOLIC BLOOD PRESSURE: 130 MMHG | TEMPERATURE: 97.4 F | OXYGEN SATURATION: 95 % | DIASTOLIC BLOOD PRESSURE: 80 MMHG | HEIGHT: 69 IN | HEART RATE: 86 BPM

## 2025-04-07 DIAGNOSIS — R51.9 ACUTE NONINTRACTABLE HEADACHE, UNSPECIFIED HEADACHE TYPE: ICD-10-CM

## 2025-04-07 LAB
ALBUMIN SERPL BCP-MCNC: 4.4 G/DL (ref 3.2–4.9)
ALBUMIN/GLOB SERPL: 1.9 G/DL
ALP SERPL-CCNC: 67 U/L (ref 30–99)
ALT SERPL-CCNC: 67 U/L (ref 2–50)
ANION GAP SERPL CALC-SCNC: 9 MMOL/L (ref 7–16)
AST SERPL-CCNC: 44 U/L (ref 12–45)
BASOPHILS # BLD AUTO: 1 % (ref 0–1.8)
BASOPHILS # BLD: 0.07 K/UL (ref 0–0.12)
BILIRUB SERPL-MCNC: 1 MG/DL (ref 0.1–1.5)
BUN SERPL-MCNC: 9 MG/DL (ref 8–22)
CALCIUM ALBUM COR SERPL-MCNC: 9.4 MG/DL (ref 8.5–10.5)
CALCIUM SERPL-MCNC: 9.7 MG/DL (ref 8.5–10.5)
CHLORIDE SERPL-SCNC: 105 MMOL/L (ref 96–112)
CO2 SERPL-SCNC: 26 MMOL/L (ref 20–33)
CREAT SERPL-MCNC: 0.8 MG/DL (ref 0.5–1.4)
EOSINOPHIL # BLD AUTO: 0.24 K/UL (ref 0–0.51)
EOSINOPHIL NFR BLD: 3.4 % (ref 0–6.9)
ERYTHROCYTE [DISTWIDTH] IN BLOOD BY AUTOMATED COUNT: 39.1 FL (ref 35.9–50)
ETHANOL BLD-MCNC: <10.1 MG/DL
GFR SERPLBLD CREATININE-BSD FMLA CKD-EPI: 125 ML/MIN/1.73 M 2
GLOBULIN SER CALC-MCNC: 2.3 G/DL (ref 1.9–3.5)
GLUCOSE SERPL-MCNC: 98 MG/DL (ref 65–99)
HCT VFR BLD AUTO: 45.2 % (ref 42–52)
HGB BLD-MCNC: 15.2 G/DL (ref 14–18)
IMM GRANULOCYTES # BLD AUTO: 0.02 K/UL (ref 0–0.11)
IMM GRANULOCYTES NFR BLD AUTO: 0.3 % (ref 0–0.9)
LYMPHOCYTES # BLD AUTO: 2.47 K/UL (ref 1–4.8)
LYMPHOCYTES NFR BLD: 34.9 % (ref 22–41)
MCH RBC QN AUTO: 29.2 PG (ref 27–33)
MCHC RBC AUTO-ENTMCNC: 33.6 G/DL (ref 32.3–36.5)
MCV RBC AUTO: 86.9 FL (ref 81.4–97.8)
MONOCYTES # BLD AUTO: 0.47 K/UL (ref 0–0.85)
MONOCYTES NFR BLD AUTO: 6.6 % (ref 0–13.4)
NEUTROPHILS # BLD AUTO: 3.81 K/UL (ref 1.82–7.42)
NEUTROPHILS NFR BLD: 53.8 % (ref 44–72)
NRBC # BLD AUTO: 0 K/UL
NRBC BLD-RTO: 0 /100 WBC (ref 0–0.2)
PLATELET # BLD AUTO: 293 K/UL (ref 164–446)
PMV BLD AUTO: 9.5 FL (ref 9–12.9)
POTASSIUM SERPL-SCNC: 3.9 MMOL/L (ref 3.6–5.5)
PROT SERPL-MCNC: 6.7 G/DL (ref 6–8.2)
RBC # BLD AUTO: 5.2 M/UL (ref 4.7–6.1)
SODIUM SERPL-SCNC: 140 MMOL/L (ref 135–145)
WBC # BLD AUTO: 7.1 K/UL (ref 4.8–10.8)

## 2025-04-07 PROCEDURE — 85025 COMPLETE CBC W/AUTO DIFF WBC: CPT

## 2025-04-07 PROCEDURE — 99284 EMERGENCY DEPT VISIT MOD MDM: CPT

## 2025-04-07 PROCEDURE — 96375 TX/PRO/DX INJ NEW DRUG ADDON: CPT

## 2025-04-07 PROCEDURE — 700111 HCHG RX REV CODE 636 W/ 250 OVERRIDE (IP): Mod: JZ,UD | Performed by: STUDENT IN AN ORGANIZED HEALTH CARE EDUCATION/TRAINING PROGRAM

## 2025-04-07 PROCEDURE — 82077 ASSAY SPEC XCP UR&BREATH IA: CPT

## 2025-04-07 PROCEDURE — 70450 CT HEAD/BRAIN W/O DYE: CPT

## 2025-04-07 PROCEDURE — A9270 NON-COVERED ITEM OR SERVICE: HCPCS | Mod: UD | Performed by: STUDENT IN AN ORGANIZED HEALTH CARE EDUCATION/TRAINING PROGRAM

## 2025-04-07 PROCEDURE — 36415 COLL VENOUS BLD VENIPUNCTURE: CPT

## 2025-04-07 PROCEDURE — 700102 HCHG RX REV CODE 250 W/ 637 OVERRIDE(OP): Mod: UD | Performed by: STUDENT IN AN ORGANIZED HEALTH CARE EDUCATION/TRAINING PROGRAM

## 2025-04-07 PROCEDURE — 96374 THER/PROPH/DIAG INJ IV PUSH: CPT

## 2025-04-07 PROCEDURE — 80053 COMPREHEN METABOLIC PANEL: CPT

## 2025-04-07 RX ORDER — ACETAMINOPHEN 325 MG/1
650 TABLET ORAL ONCE
Status: COMPLETED | OUTPATIENT
Start: 2025-04-07 | End: 2025-04-07

## 2025-04-07 RX ORDER — PROCHLORPERAZINE EDISYLATE 5 MG/ML
10 INJECTION INTRAMUSCULAR; INTRAVENOUS ONCE
Status: COMPLETED | OUTPATIENT
Start: 2025-04-07 | End: 2025-04-07

## 2025-04-07 RX ORDER — DIPHENHYDRAMINE HYDROCHLORIDE 50 MG/ML
25 INJECTION, SOLUTION INTRAMUSCULAR; INTRAVENOUS ONCE
Status: COMPLETED | OUTPATIENT
Start: 2025-04-07 | End: 2025-04-07

## 2025-04-07 RX ADMIN — DIPHENHYDRAMINE HYDROCHLORIDE 25 MG: 50 INJECTION, SOLUTION INTRAMUSCULAR; INTRAVENOUS at 20:45

## 2025-04-07 RX ADMIN — PROCHLORPERAZINE EDISYLATE 10 MG: 5 INJECTION INTRAMUSCULAR; INTRAVENOUS at 20:46

## 2025-04-07 RX ADMIN — ACETAMINOPHEN 650 MG: 325 TABLET ORAL at 20:46

## 2025-04-07 ASSESSMENT — PAIN DESCRIPTION - PAIN TYPE: TYPE: ACUTE PAIN

## 2025-04-07 ASSESSMENT — FIBROSIS 4 INDEX: FIB4 SCORE: 0.41

## 2025-04-08 NOTE — ED PROVIDER NOTES
ED Provider Note    CHIEF COMPLAINT  Chief Complaint   Patient presents with    Headache      For 4 days        EXTERNAL RECORDS REVIEWED  N/A    HPI/ROS  LIMITATION TO HISTORY   none  OUTSIDE HISTORIAN(S):  none    Fidel Leger is a 25 y.o. male who presents with headache.  Patient says it started 4 days ago.  It has been intermittent since then and seems to be worse with specific movements and straining.  He says that it was worse this morning but is not always worse in the morning  or a specific time of day.  He has nausea but no vomiting.  He says it has been intermittent since starting and a pounding sensation headache on the left side. Symptoms not sudden or maximal in onset.  He does have headaches sometimes prior to this and has had a headache similar to this but it has never persisted this long.  He has been taking Advil without relief.  He denies any visual loss or changes.  No numbness, weakness or speech changes or other neurologic symptom    PAST MEDICAL HISTORY   Denies    SURGICAL HISTORY   has a past surgical history that includes tonsillectomy (2011).    FAMILY HISTORY  Family History   Problem Relation Age of Onset    Diabetes Mother     No Known Problems Father     Diabetes Sister     Diabetes Brother     No Known Problems Daughter     No Known Problems Daughter     No Known Problems Daughter     Cancer Neg Hx     Heart Disease Neg Hx        SOCIAL HISTORY  Social History     Tobacco Use    Smoking status: Never    Smokeless tobacco: Never   Vaping Use    Vaping status: Some Days   Substance and Sexual Activity    Alcohol use: Yes     Comment: occasionally; once every 4 months    Drug use: Never    Sexual activity: Not Currently     Partners: Female     Birth control/protection: Condom Male       CURRENT MEDICATIONS  Home Medications       Reviewed by Hyacinth Elaine R.N. (Registered Nurse) on 04/07/25 at 1931  Med List Status: Partial     Medication Last Dose Status  "  cyclobenzaprine (FLEXERIL) 10 mg Tab  Active   dicyclomine (BENTYL) 10 MG Cap  Active   dicyclomine (BENTYL) 20 MG Tab  Active   lidocaine (ASPERFLEX) 4 % Patch  Active   ondansetron (ZOFRAN ODT) 4 MG TABLET DISPERSIBLE  Active   ondansetron (ZOFRAN ODT) 4 MG TABLET DISPERSIBLE  Active   terbinafine (LAMISIL) 1 % cream  Active                    ALLERGIES  No Known Allergies    PHYSICAL EXAM  VITAL SIGNS: /80   Pulse 86   Temp 36.3 °C (97.4 °F) (Temporal)   Resp 16   Ht 1.753 m (5' 9.02\")   Wt 108 kg (237 lb 10.5 oz)   SpO2 95%   BMI 35.08 kg/m²    Constitutional: Awake and alert Non toxic  HENT: Normal inspection    Eyes: Normal inspection  Neck: Grossly normal range of motion.  Cardiovascular: Normal heart rate, Normal rhythm.    Thorax & Lungs: No respiratory distress,   Abdomen: Soft, non-distended, nontender   Skin: No obvious rash.  Extremities: Warm, well perfused.  Neurologic: A&Ox 4. No focal deficit   Psychiatric: Normal for situation      EKG/LABS  Results for orders placed or performed during the hospital encounter of 04/07/25   CBC WITH DIFFERENTIAL    Collection Time: 04/07/25  8:13 PM   Result Value Ref Range    WBC 7.1 4.8 - 10.8 K/uL    RBC 5.20 4.70 - 6.10 M/uL    Hemoglobin 15.2 14.0 - 18.0 g/dL    Hematocrit 45.2 42.0 - 52.0 %    MCV 86.9 81.4 - 97.8 fL    MCH 29.2 27.0 - 33.0 pg    MCHC 33.6 32.3 - 36.5 g/dL    RDW 39.1 35.9 - 50.0 fL    Platelet Count 293 164 - 446 K/uL    MPV 9.5 9.0 - 12.9 fL    Neutrophils-Polys 53.80 44.00 - 72.00 %    Lymphocytes 34.90 22.00 - 41.00 %    Monocytes 6.60 0.00 - 13.40 %    Eosinophils 3.40 0.00 - 6.90 %    Basophils 1.00 0.00 - 1.80 %    Immature Granulocytes 0.30 0.00 - 0.90 %    Nucleated RBC 0.00 0.00 - 0.20 /100 WBC    Neutrophils (Absolute) 3.81 1.82 - 7.42 K/uL    Lymphs (Absolute) 2.47 1.00 - 4.80 K/uL    Monos (Absolute) 0.47 0.00 - 0.85 K/uL    Eos (Absolute) 0.24 0.00 - 0.51 K/uL    Baso (Absolute) 0.07 0.00 - 0.12 K/uL    Immature " Granulocytes (abs) 0.02 0.00 - 0.11 K/uL    NRBC (Absolute) 0.00 K/uL   COMP METABOLIC PANEL    Collection Time: 04/07/25  8:13 PM   Result Value Ref Range    Sodium 140 135 - 145 mmol/L    Potassium 3.9 3.6 - 5.5 mmol/L    Chloride 105 96 - 112 mmol/L    Co2 26 20 - 33 mmol/L    Anion Gap 9.0 7.0 - 16.0    Glucose 98 65 - 99 mg/dL    Bun 9 8 - 22 mg/dL    Creatinine 0.80 0.50 - 1.40 mg/dL    Calcium 9.7 8.5 - 10.5 mg/dL    Correct Calcium 9.4 8.5 - 10.5 mg/dL    AST(SGOT) 44 12 - 45 U/L    ALT(SGPT) 67 (H) 2 - 50 U/L    Alkaline Phosphatase 67 30 - 99 U/L    Total Bilirubin 1.0 0.1 - 1.5 mg/dL    Albumin 4.4 3.2 - 4.9 g/dL    Total Protein 6.7 6.0 - 8.2 g/dL    Globulin 2.3 1.9 - 3.5 g/dL    A-G Ratio 1.9 g/dL   DIAGNOSTIC ALCOHOL    Collection Time: 04/07/25  8:13 PM   Result Value Ref Range    Diagnostic Alcohol <10.1 <10.1 mg/dL   ESTIMATED GFR    Collection Time: 04/07/25  8:13 PM   Result Value Ref Range    GFR (CKD-EPI) 125 >60 mL/min/1.73 m 2       RADIOLOGY/PROCEDURES   I have independently interpreted the diagnostic imaging associated with this visit and am waiting the final reading from the radiologist.   My preliminary interpretation is as follows: No large bleed    Radiologist interpretation:  CT-HEAD W/O   Final Result         1.  No acute intracranial abnormality.                   COURSE & MEDICAL DECISION MAKING    ASSESSMENT, COURSE AND PLAN  Care Narrative: This is a 25-year-old who presents with 4 days left sided throbbing headache.  He arrives neurologically intact, mildly hypertensive overall well-appearing.  Given intermittent nature of symptoms, young age, reassuring exam I have very low suspicion for a subarachnoid bleed.  He has no morning headaches to suggest an intracranial mass.  Additionally no signs to suggest this on CT. CT also without any evidence of bleed.  He is not having neck pain or no neurological deficit to suggest cervical artery dissection.  He is afebrile, nontoxic and I  doubt CNS infection such as meningitis or brain abscess.  He has no known risk factors for venous sinus thrombosis.  At this age doubt temporal arteritis or acute angle-closure glaucoma.  Labs are reassuring with normal white blood cell count and no significant electrolyte derangements    He was treated with Tylenol, Compazine, Benadryl and  on reevaluation he feels improved.  Presentation seems to be more likely migraine headache or tension type headache.  Reassured that his headache has resolved discussed the importance of close follow-up with PCP for consideration of further imaging if having ongoing symptoms he understands to return to the ER sooner if worsening.          DISPOSITION AND DISCUSSIONS  I have discussed management of the patient with the following physicians and STUART's:  none    Discussion of management with other QHP or appropriate source(s): None     Escalation of care considered, and ultimately not performed:IV fluids he is able to tolerate PO    Barriers to care at this time, including but not limited to: none    Decision tools and prescription drugs considered including, but not limited to: Pain Medications Non narcotic OTC medications .    FINAL DIAGNOSIS  1. Acute nonintractable headache, unspecified headache type Acute        Electronically signed by: Shell Haines M.D., 4/7/2025 8:30 PM

## 2025-04-08 NOTE — ED NOTES
Pt ambulatory with steady gait hallway bed  Pt is GCS 15, speaking in full sentences, follows commands and responds appropriately to questions. Resp are even and unlabored.   Chart up fro erp

## 2025-04-08 NOTE — ED TRIAGE NOTES
"25 y.o. male Fidel Leger  Chief Complaint   Patient presents with    Headache      For 4 days      Patient presented to ED with complaints of headaches started on 04/03/2025, patient denies any other symptoms , stated he went worse this morning.     Patient AAO X4 , Respirations even and unlabored on room air , afebrile at this time.     BP (!) 165/85   Pulse 90   Temp 36.3 °C (97.4 °F) (Temporal)   Resp 16   Ht 1.753 m (5' 9.02\")   Wt 108 kg (237 lb 10.5 oz)   SpO2 94%   BMI 35.08 kg/m²     No Known Allergies    Past Surgical History:   Procedure Laterality Date    TONSILLECTOMY  2011     Pt made aware of triage process, placed back into lobby, educated pt to tell staff of any worsening of symptoms       "

## 2025-04-08 NOTE — ED NOTES
Pt discharged to home. Pt was given follow up instructions, + work note. Pt verbalized understanding of all instructions for discharge and is ambulatory out of ED with steady gait. AOx4

## 2025-06-22 ENCOUNTER — PHARMACY VISIT (OUTPATIENT)
Dept: PHARMACY | Facility: MEDICAL CENTER | Age: 25
End: 2025-06-22
Payer: COMMERCIAL

## 2025-06-22 ENCOUNTER — HOSPITAL ENCOUNTER (EMERGENCY)
Facility: MEDICAL CENTER | Age: 25
End: 2025-06-22
Attending: EMERGENCY MEDICINE
Payer: COMMERCIAL

## 2025-06-22 VITALS
OXYGEN SATURATION: 98 % | RESPIRATION RATE: 14 BRPM | HEART RATE: 99 BPM | WEIGHT: 240.96 LBS | TEMPERATURE: 97.4 F | SYSTOLIC BLOOD PRESSURE: 138 MMHG | HEIGHT: 69 IN | DIASTOLIC BLOOD PRESSURE: 83 MMHG | BODY MASS INDEX: 35.69 KG/M2

## 2025-06-22 DIAGNOSIS — S39.012A STRAIN OF LUMBAR REGION, INITIAL ENCOUNTER: Primary | ICD-10-CM

## 2025-06-22 DIAGNOSIS — M62.838 MUSCLE SPASM: ICD-10-CM

## 2025-06-22 PROCEDURE — 99282 EMERGENCY DEPT VISIT SF MDM: CPT

## 2025-06-22 PROCEDURE — RXMED WILLOW AMBULATORY MEDICATION CHARGE: Performed by: EMERGENCY MEDICINE

## 2025-06-22 RX ORDER — IBUPROFEN 800 MG/1
800 TABLET, FILM COATED ORAL EVERY 8 HOURS PRN
Qty: 9 TABLET | Refills: 0 | Status: SHIPPED | OUTPATIENT
Start: 2025-06-22 | End: 2025-06-25

## 2025-06-22 RX ORDER — CYCLOBENZAPRINE HCL 10 MG
10 TABLET ORAL 3 TIMES DAILY PRN
Qty: 10 TABLET | Refills: 0 | Status: SHIPPED | OUTPATIENT
Start: 2025-06-22 | End: 2025-06-26

## 2025-06-22 RX ORDER — LIDOCAINE 50 MG/G
1 PATCH TOPICAL EVERY 24 HOURS
Qty: 5 PATCH | Refills: 0 | Status: SHIPPED | OUTPATIENT
Start: 2025-06-22 | End: 2025-06-27

## 2025-06-22 RX ORDER — ACETAMINOPHEN 500 MG
1000 TABLET ORAL EVERY 6 HOURS PRN
Qty: 20 TABLET | Refills: 0 | Status: SHIPPED | OUTPATIENT
Start: 2025-06-22 | End: 2025-06-26

## 2025-06-22 ASSESSMENT — FIBROSIS 4 INDEX: FIB4 SCORE: 0.48

## 2025-06-23 NOTE — Clinical Note
REFERRAL APPROVAL NOTICE         Sent on June 23, 2025                   Fidel Valdovinos Leger  [address]  [place] [address]                   Dear Mr. Aruna Leger,    After a careful review of the medical information and benefit coverage, [facility] has processed your referral. See below for additional details.    If applicable, you must be actively enrolled with your insurance for coverage of the authorized service. If you have any questions regarding your coverage, please contact your insurance directly.    REFERRAL INFORMATION   Referral #:  [id number]  Referred-To Department    Referred-By Provider:  Physical Therapy    Robert Escobar   [facility] [place]      [address]  [place] [address]  [phone] [address]  [place] [address]  [phone]    Referral Start Date:  06/22/2025  Referral End Date:   06/22/2026             SCHEDULING  If you do not already have an appointment, please call [phone] to make an appointment.     MORE INFORMATION  If you do not already have a [employer] account, sign up at: [email or web address]  You can access your medical information, make appointments, see lab results, billing information, and more.  If you have questions regarding this referral, please contact  the [facility] Referrals department at:             [phone]. Monday - Friday 8:00AM - 5:00PM.     Sincerely,    [facility]

## 2025-06-23 NOTE — ED PROVIDER NOTES
ED Provider Note    Scribed for Robert Escobar by Candelaria Muñoz. 6/22/2025  9:28 PM    Primary care provider: Sarai Levin M.D.  Means of arrival: Walk-In  History obtained from: Patient  History limited by: None    CHIEF COMPLAINT  Chief Complaint   Patient presents with    Low Back Pain     Mid low back pain x5 days after moving a barrel of trash  Worse today, using icy hot with no relief         EXTERNAL RECORDS REVIEWED  No recent outpatient or inpatient notes to review.    HPI/ROS  LIMITATION TO HISTORY   Select: : None  OUTSIDE HISTORIAN(S):  None    HPI  Fidel Leger is a 25 y.o. male who presents to the Emergency Department lower back pain onset five days ago. Patient reports that he was moving a barrel of trash when he developed a sudden onset of pain to his lower back. He reports that since then there has been no alleviation. He notes today he tried to use over the counter medications for relief but still is in pain. He notes that taking breaths sometimes will exacerbate the pain. He denies loss of bowel or bladder control. He denies difficulty with ambulation. He denies numbness or tingling. He denies any other symptoms at this time. There are no known alleviating or exacerbating factors.      REVIEW OF SYSTEMS  As above, all other systems reviewed and are negative.   See HPI for further details.     PAST MEDICAL HISTORY   None noted   SURGICAL HISTORY   has a past surgical history that includes tonsillectomy (2011).  SOCIAL HISTORY  Social History[1]   Social History     Substance and Sexual Activity   Drug Use Never     FAMILY HISTORY  Family History   Problem Relation Age of Onset    Diabetes Mother     No Known Problems Father     Diabetes Sister     Diabetes Brother     No Known Problems Daughter     No Known Problems Daughter     No Known Problems Daughter     Cancer Neg Hx     Heart Disease Neg Hx      CURRENT MEDICATIONS  Home Medications       Reviewed by Carol  "ZORAIDA Sofia (Registered Nurse) on 06/22/25 at 2027  Med List Status: Partial     Medication Last Dose Status   cyclobenzaprine (FLEXERIL) 10 mg Tab  Active   dicyclomine (BENTYL) 10 MG Cap  Active   dicyclomine (BENTYL) 20 MG Tab  Active   lidocaine (ASPERFLEX) 4 % Patch  Active   ondansetron (ZOFRAN ODT) 4 MG TABLET DISPERSIBLE  Active   ondansetron (ZOFRAN ODT) 4 MG TABLET DISPERSIBLE  Active   terbinafine (LAMISIL) 1 % cream  Active                  ALLERGIES  Allergies[2]    PHYSICAL EXAM    VITAL SIGNS:   Vitals:    06/22/25 2019 06/22/25 2026 06/22/25 2132   BP: (!) 145/84  138/83   Pulse: (!) 105  99   Resp: 15  14   Temp: 36.6 °C (97.8 °F)  36.3 °C (97.4 °F)   TempSrc:   Temporal   SpO2: 96%  98%   Weight:  109 kg (240 lb 15.4 oz)    Height:  1.753 m (5' 9\")        Vitals: My interpretation: hypertensive, tachycardic, afebrile, not hypoxic    Reinterpretation of vitals: Improving.     PE:   Gen: sitting comfortably, speaking clearly, appears in no acute distress   ENT: Mucous membranes moist, posterior pharynx clear, uvula midline, nares patent bilaterally   Neck: Supple, FROM  Pulmonary: Lungs are clear to auscultation bilaterally. No tachypnea  CV:  RRR, no murmur appreciated, pulses 2+ in both upper and lower extremities  Abdomen: soft, NT/ND; no rebound/guarding  Musculoskeletal: Bilateral paraspinal lumbar muscle tenderness left greater than right, no midline tenderness in the CT or L-spine, no deformity or step-off present, no signs of trauma.  Negative straight leg test.  : no CVA or suprapubic tenderness   Neuro: A&Ox4 (person, place, time, situation), speech fluent, gait steady, no focal deficits appreciated  Skin: No rash or lesions.  No pallor or jaundice.  No cyanosis.  Warm and dry.   COURSE & MEDICAL DECISION MAKING  Nursing notes, VS, PMSFHx, labs, imaging, EKG reviewed in chart.    ED Observation Status? No; Patient does not meet criteria for ED Observation.     Ddx: sprain, strain, " fracture, dislocation    MDM: 9:28 PM Fidel Leger is a 25 y.o. male who presented with evaluation of 5 days of bilateral lower back pain that started after lifting a heavy dresser.  No history of back surgeries or injuries previously.  Denies any loss of bladder or bowel continence.  Denies any fevers or IV drug use.  No rash.  No decrease sensation or strength in the lower extremities.  Upon arrival here vital signs are normal.  Physical exam is very reassuring with typical lower back strain and muscle spasm on the bilateral paraspinal lumbar muscles that are tender to palpation left greater than right.  No signs of midline tenderness step-off or deformity or signs of trauma or injury.  Negative straight leg test and he has normal strength and motor sensation throughout in the lower extremities.  Ultimately will need to do stretching sizes, foam rolling, massage therapy, physical therapy, Tylenol, Motrin, Flexeril and lidocaine patches.  I discussed all of this with him and counseled him on weight loss and exercise and weight training.  Prescription sent to the pharmacy for him.  Referral for PCP and physical therapy sent.  Patient is appropriate at this time for discharge.  Return precautions were discussed and he verbalized understanding and is amenable.    Z71.9 is a billable ICD-10 code used to specify a medical diagnosis of counseling, unspecified. I spent 4 minutes counseling patint on weight loss, diet, exercise, stretching.    ADDITIONAL PROBLEM LIST AND DISPOSITION    I have discussed management of the patient with the following physicians and STUART's:  None    Discussion of management with other QHP or appropriate source(s): None     Escalation of care considered, and ultimately not performed:acute inpatient care management, however at this time, the patient is most appropriate for outpatient management    Barriers to care at this time, including but not limited to: None.     Decision tools  and prescription drugs considered including, but not limited to: Pain Medications Tylenol and Motrin.    FINAL IMPRESSION  1. Strain of lumbar region, initial encounter Acute   2. Muscle spasm Acute      Candelaria AHN (Scribe), am scribing for, and in the presence of, Robert Escobar.    Electronically signed by: Candelaria Muñoz (Scribe), 6/22/2025    Robert AHN personally performed the services described in this documentation, as scribed by Candelaria Muñoz in my presence, and it is both accurate and complete.    The note accurately reflects work and decisions made by me.  Robert Escobar  6/22/2025  9:47 PM         [1]   Social History  Tobacco Use    Smoking status: Former     Types: Cigarettes    Smokeless tobacco: Former   Vaping Use    Vaping status: Some Days   Substance Use Topics    Alcohol use: Yes     Comment: rare    Drug use: Never   [2] No Known Allergies

## 2025-06-23 NOTE — ED TRIAGE NOTES
"Chief Complaint   Patient presents with    Low Back Pain     Mid low back pain x5 days after moving a barrel of trash  Worse today, using icy hot with no relief         Pt walked in to triage. Pt A&Ox4, RA.     Pt educated on alerting staff in changes to condition. Pt verbalized understanding. Protocol ordered.     BP (!) 145/84   Pulse (!) 105   Temp 36.6 °C (97.8 °F)   Resp 15   Ht 1.753 m (5' 9\")   Wt 109 kg (240 lb 15.4 oz)   SpO2 96%   BMI 35.58 kg/m²     "